# Patient Record
Sex: MALE | Race: ASIAN | NOT HISPANIC OR LATINO | Employment: FULL TIME | URBAN - METROPOLITAN AREA
[De-identification: names, ages, dates, MRNs, and addresses within clinical notes are randomized per-mention and may not be internally consistent; named-entity substitution may affect disease eponyms.]

---

## 2020-04-21 DIAGNOSIS — Z01.84 ANTIBODY RESPONSE EXAMINATION: ICD-10-CM

## 2020-04-22 ENCOUNTER — LAB VISIT (OUTPATIENT)
Dept: LAB | Facility: HOSPITAL | Age: 30
End: 2020-04-22
Attending: INTERNAL MEDICINE
Payer: COMMERCIAL

## 2020-04-22 DIAGNOSIS — Z01.84 ANTIBODY RESPONSE EXAMINATION: ICD-10-CM

## 2020-04-22 LAB — SARS-COV-2 IGG SERPL QL IA: NEGATIVE

## 2020-04-22 PROCEDURE — 86769 SARS-COV-2 COVID-19 ANTIBODY: CPT

## 2020-04-22 PROCEDURE — 36415 COLL VENOUS BLD VENIPUNCTURE: CPT

## 2020-06-12 ENCOUNTER — OFFICE VISIT (OUTPATIENT)
Dept: SURGERY | Facility: CLINIC | Age: 30
End: 2020-06-12
Payer: COMMERCIAL

## 2020-06-12 VITALS
DIASTOLIC BLOOD PRESSURE: 77 MMHG | HEART RATE: 50 BPM | WEIGHT: 156.75 LBS | HEIGHT: 70 IN | SYSTOLIC BLOOD PRESSURE: 119 MMHG | BODY MASS INDEX: 22.44 KG/M2

## 2020-06-12 DIAGNOSIS — K64.8 INTERNAL HEMORRHOIDS WITH COMPLICATION: Primary | ICD-10-CM

## 2020-06-12 PROCEDURE — 99999 PR PBB SHADOW E&M-EST. PATIENT-LVL III: CPT | Mod: PBBFAC,,, | Performed by: COLON & RECTAL SURGERY

## 2020-06-12 PROCEDURE — 3008F BODY MASS INDEX DOCD: CPT | Mod: CPTII,S$GLB,, | Performed by: COLON & RECTAL SURGERY

## 2020-06-12 PROCEDURE — 99203 PR OFFICE/OUTPT VISIT, NEW, LEVL III, 30-44 MIN: ICD-10-PCS | Mod: 25,S$GLB,, | Performed by: COLON & RECTAL SURGERY

## 2020-06-12 PROCEDURE — 99999 PR PBB SHADOW E&M-EST. PATIENT-LVL III: ICD-10-PCS | Mod: PBBFAC,,, | Performed by: COLON & RECTAL SURGERY

## 2020-06-12 PROCEDURE — 3008F PR BODY MASS INDEX (BMI) DOCUMENTED: ICD-10-PCS | Mod: CPTII,S$GLB,, | Performed by: COLON & RECTAL SURGERY

## 2020-06-12 PROCEDURE — 99203 OFFICE O/P NEW LOW 30 MIN: CPT | Mod: 25,S$GLB,, | Performed by: COLON & RECTAL SURGERY

## 2020-06-12 RX ORDER — MULTIVITAMIN
1 TABLET ORAL DAILY
COMMUNITY
End: 2023-03-25

## 2020-06-12 NOTE — PROGRESS NOTES
Subjective:       Patient ID: Joe Stephens is a 29 y.o. male.    Chief Complaint: Hemorrhoids    HPI new patient.  Greater than 10 year history of hemorrhoids manifest by bleeding and prolapse as well as some the post of toward pain.  Now has developed an area of chronic prolapse with discomfort.  Had banding greater than 5 years ago with no improvement.  Takes daily fiber in consistently.  He had a colonoscopy at the time of his banding.    Review of Systems   Constitutional: Negative for chills and fever.   Respiratory: Negative for cough and shortness of breath.    Cardiovascular: Negative for chest pain and palpitations.   Gastrointestinal: Negative for nausea and vomiting.   Genitourinary: Negative for dysuria and urgency.   Neurological: Negative for seizures and numbness.       Objective:      Physical Exam   Constitutional: He is oriented to person, place, and time. He appears well-developed and well-nourished.   Eyes: Conjunctivae and EOM are normal.   Pulmonary/Chest: Effort normal. No respiratory distress.   Genitourinary:   Genitourinary Comments: Anorectal Exam:     Perianal skin:  Right posterolateral internal external hemorrhoids with prolapse.  Reduce is incompletely    EREN: Normal resting and squeeze tone.  No masses except the palpable right posterolateral hemorrhoid.  Nontender    Anoscopy:  Normal distal rectal mucosa. Internal hemorrhoids with stigmata of bleeding or prolapse.      Musculoskeletal: Normal range of motion. He exhibits no edema.   Neurological: He is alert and oriented to person, place, and time.   Skin: Skin is warm and dry.       Assessment:       1. Internal hemorrhoids with complication        Plan:       Discuss treatment of hemorrhoids.  In this case because of the failure of the right posterolateral to reduce think he is best served by operative hemorrhoidectomy.  I have explained the risks, benefits, and alternatives of the procedure in detail.  The patient voices  understanding and all questions have been answered. The patient agrees to proceed as planned.    He will call to schedule.

## 2020-07-01 ENCOUNTER — TELEPHONE (OUTPATIENT)
Dept: SURGERY | Facility: CLINIC | Age: 30
End: 2020-07-01

## 2020-07-01 NOTE — TELEPHONE ENCOUNTER
----- Message from Nica Vazquez sent at 7/1/2020  2:01 PM CDT -----  Joe Stephens calling regarding Appointment Access  (message) want to schedule a hemorrhoid procedure.  He had his consult already. He is trying to schedule this with his time off from work. He is looking for 7/9 or 7/10 or 8/20 or 8/21. Please advise. [phone]

## 2020-07-01 NOTE — TELEPHONE ENCOUNTER
Spoke with patient. Requesting surgery. His available dates provided. Informed him I will speak with Dr. Baker tomorrow and call him back.

## 2020-07-07 ENCOUNTER — TELEPHONE (OUTPATIENT)
Dept: SURGERY | Facility: CLINIC | Age: 30
End: 2020-07-07

## 2020-07-07 NOTE — TELEPHONE ENCOUNTER
----- Message from Carlie Meek sent at 7/7/2020  9:44 AM CDT -----  Contact: self @ [phone]  Pt is calling to schedule his procedure.  He would like to be scheduled on 8-19-20, 8-17-20, 8-21-20 or 8-24-20 if possible.  Pls call.

## 2020-07-07 NOTE — TELEPHONE ENCOUNTER
Spoke with patient.  Informed him that the surgery is scheduled for 8/21 in the afternoon.  He can drink clear liquids until 2 hours before surgery and no solids after midnight.  He should use 2 enemas the morning of surgery.

## 2020-07-14 DIAGNOSIS — K64.8 INTERNAL HEMORRHOIDS WITH COMPLICATION: Primary | ICD-10-CM

## 2020-07-27 ENCOUNTER — TELEPHONE (OUTPATIENT)
Dept: PREADMISSION TESTING | Facility: HOSPITAL | Age: 30
End: 2020-07-27

## 2020-07-27 NOTE — TELEPHONE ENCOUNTER
----- Message from Dbebi Suggs RN sent at 7/27/2020 11:16 AM CDT -----  8/21 LABS    Please schedule for labs. Thank you :)

## 2020-07-27 NOTE — ANESTHESIA PAT ROS NOTE
07/27/2020  Joe Stephens is a 30 y.o., male.      Pre-op Assessment          Review of Systems  Anesthesia Hx:  History of prior surgery of interest to airway management or planning: Previous anesthesia: General Denies Family Hx of Anesthesia complications.   Denies Personal Hx of Anesthesia complications.   Social:  Non-Smoker, Social Alcohol Use    Hematology/Oncology:     Oncology Normal     EENT/Dental:EENT/Dental Normal   Cardiovascular:    Denies Angina.  Functional Capacity 6-7 METS    Pulmonary:   Denies Shortness of breath.  Denies Recent URI.    Renal/:  Renal/ Normal     Hepatic/GI:   HEMORRHOIDS   Musculoskeletal:  Musculoskeletal Normal    Neurological:  Neurology Normal    Endocrine:  Endocrine Normal    Psych:  Psychiatric Normal              Anesthesia Assessment: Preoperative EQUATION    Planned Procedure: Procedure(s) (LRB):  HEMORRHOIDECTOMY (N/A)  Requested Anesthesia Type:Monitor Anesthesia Care  Surgeon: Joon Baker MD  Service: Colon and Rectal  Known or anticipated Date of Surgery:8/21/2020    Surgeon notes: reviewed    Electronic QUestionnaire Assessment completed via nurse interview with patient.        Triage considerations:     The patient has no apparent active cardiac condition (No unstable coronary Syndrome such as severe unstable angina or recent [<1 month] myocardial infarction, decompensated CHF, severe valvular   disease or significant arrhythmia)    Previous anesthesia records:Not available    Last PCP note: outside [facility]   Subspecialty notes: CRS    Other important co-morbidities: N/A      Tests already available:  No recent tests.             Instructions given. (See in Nurse's note)    Optimization:  Anesthesia Preop Clinic Assessment  Indicated - NOT INDICATED    Medical Opinion Indicated - NOT INDICATED       Sub-specialist consult indicated:  NOT  INDICATED       Plan:    Testing:  BMP and Hematology Profile     Patient  has previously scheduled Medical Appointment: NOT AT THIS TIME    Navigation: Tests Scheduled.

## 2020-08-18 ENCOUNTER — LAB VISIT (OUTPATIENT)
Dept: URGENT CARE | Facility: CLINIC | Age: 30
End: 2020-08-18
Payer: COMMERCIAL

## 2020-08-18 ENCOUNTER — TELEPHONE (OUTPATIENT)
Dept: SURGERY | Facility: CLINIC | Age: 30
End: 2020-08-18

## 2020-08-18 VITALS — TEMPERATURE: 98 F

## 2020-08-18 DIAGNOSIS — Z01.818 PREOP TESTING: ICD-10-CM

## 2020-08-18 PROCEDURE — U0003 INFECTIOUS AGENT DETECTION BY NUCLEIC ACID (DNA OR RNA); SEVERE ACUTE RESPIRATORY SYNDROME CORONAVIRUS 2 (SARS-COV-2) (CORONAVIRUS DISEASE [COVID-19]), AMPLIFIED PROBE TECHNIQUE, MAKING USE OF HIGH THROUGHPUT TECHNOLOGIES AS DESCRIBED BY CMS-2020-01-R: HCPCS

## 2020-08-18 PROCEDURE — 99211 PR OFFICE/OUTPT VISIT, EST, LEVL I: ICD-10-PCS | Mod: S$GLB,,, | Performed by: PHYSICIAN ASSISTANT

## 2020-08-18 PROCEDURE — 99211 OFF/OP EST MAY X REQ PHY/QHP: CPT | Mod: S$GLB,,, | Performed by: PHYSICIAN ASSISTANT

## 2020-08-18 NOTE — TELEPHONE ENCOUNTER
Spoke with patient regarding his covid test needed for surgery on Friday 8/21.  He is not able to make the appointment at [facility].  Informed him that he can go to the urgent care at [address] [facility] between 9a - 9p.

## 2020-08-18 NOTE — TELEPHONE ENCOUNTER
----- Message from Nica Vazquez sent at 8/18/2020 12:15 PM CDT -----  Pt call stated he is not sure if he could do it today, because he have full clinic in [place]. He stated he will check to see if [place] [facility] can swab him. [phone].     Margaret Dong please call him if that is a problem

## 2020-08-19 LAB — SARS-COV-2 RNA RESP QL NAA+PROBE: NOT DETECTED

## 2020-08-20 ENCOUNTER — TELEPHONE (OUTPATIENT)
Dept: SURGERY | Facility: CLINIC | Age: 30
End: 2020-08-20

## 2020-08-20 ENCOUNTER — ANESTHESIA EVENT (OUTPATIENT)
Dept: SURGERY | Facility: HOSPITAL | Age: 30
End: 2020-08-20
Payer: COMMERCIAL

## 2020-08-20 NOTE — TELEPHONE ENCOUNTER
----- Message from Brittany Flood sent at 8/20/2020  4:50 PM CDT -----  Contact: pt  Pt returned call to clinic to confirm message was received re surgery

## 2020-08-20 NOTE — TELEPHONE ENCOUNTER
Spoke with patient and informed him that Dr. Baker moved his surgery time up.  He would like for him to be here at 8:30 am.

## 2020-08-20 NOTE — ANESTHESIA PREPROCEDURE EVALUATION
[facility]  Anesthesia Pre-Operative Evaluation         Patient Name: Joe Stephens  YOB: 1990  MRN: [id number]    SUBJECTIVE:     Pre-operative evaluation for Procedure(s) (LRB):  HEMORRHOIDECTOMY (N/A)     08/21/2020    Joe Stephens is a 30 y.o. male w/ a significant PMHx of hemorrhoids who now has developed chronic prolapse with discomfort. He previously had banding without improvement 5 years ago at an OSH.    Patient now presents for the above procedure(s).      LDA: None documented.       Prev airway: None documented.    Drips: None documented.      Patient Active Problem List   Diagnosis    Internal hemorrhoids with complication       Review of patient's allergies indicates:   Allergen Reactions    Sulfa (sulfonamide antibiotics)        Current Inpatient Medications:      No current facility-administered medications on file prior to encounter.      Current Outpatient Medications on File Prior to Encounter   Medication Sig Dispense Refill    multivitamin (ONE DAILY MULTIVITAMIN) per tablet Take 1 tablet by mouth once daily.      phenyleph/pramoxin/glycr/w.pet (HEMORRHOIDAL CREAM RECT) Place rectally.         Past Surgical History:   Procedure Laterality Date    COLONOSCOPY  2010       Social History     Socioeconomic History    Marital status: Single     Spouse name: Not on file    Number of children: Not on file    Years of education: Not on file    Highest education level: Not on file   Occupational History    Not on file   Social Needs    Financial resource strain: Not on file    Food insecurity     Worry: Not on file     Inability: Not on file    Transportation needs     Medical: Not on file     Non-medical: Not on file   Tobacco Use    Smoking status: Never Smoker   Substance and Sexual Activity    Alcohol use: Not on file    Drug use: Not on file    Sexual activity: Not on file   Lifestyle    Physical activity     Days per week: Not on file      Minutes per session: Not on file    Stress: Not on file   Relationships    Social connections     Talks on phone: Not on file     Gets together: Not on file     Attends Shinto service: Not on file     Active member of club or organization: Not on file     Attends meetings of clubs or organizations: Not on file     Relationship status: Not on file   Other Topics Concern    Not on file   Social History Narrative    Not on file       OBJECTIVE:     Vital Signs Range (Last 24H):  Temp:  [37 °C (98.6 °F)]   Pulse:  [59]   Resp:  [18]   BP: (135)/(76)   SpO2:  [100 %]       Significant Labs:  No results found for: WBC, HGB, HCT, PLT, CHOL, TRIG, HDL, LDLDIRECT, ALT, AST, NA, K, CL, CREATININE, BUN, CO2, TSH, PSA, INR, GLUF, HGBA1C, MICROALBUR    Diagnostic Studies: No relevant studies.    EKG:   No results found for this or any previous visit.    2D ECHO:  TTE:  No results found for this or any previous visit.    RAJ:  No results found for this or any previous visit.    ASSESSMENT/PLAN:         Anesthesia Evaluation    I have reviewed the Patient Summary Reports.     I have reviewed the Nursing Notes. I have reviewed the NPO Status.   I have reviewed the Medications.     Review of Systems  Anesthesia Hx:  No problems with previous Anesthesia  History of prior surgery of interest to airway management or planning: Previous anesthesia: General Denies Family Hx of Anesthesia complications.   Denies Personal Hx of Anesthesia complications.   Social:  Non-Smoker, Social Alcohol Use    Hematology/Oncology:     Oncology Normal     EENT/Dental:EENT/Dental Normal   Cardiovascular:    Denies Angina.  Functional Capacity good / => 4 METS    Pulmonary:   Denies Shortness of breath.  Denies Recent URI.    Renal/:  Renal/ Normal     Hepatic/GI:   HEMORRHOIDS   Musculoskeletal:  Musculoskeletal Normal    Neurological:  Neurology Normal    Endocrine:  Endocrine Normal    Psych:  Psychiatric Normal           Physical  Exam  General:  Well nourished    Airway/Jaw/Neck:  Airway Findings: Mouth Opening: Normal Tongue: Normal  General Airway Assessment: Adult  Mallampati: I  TM Distance: Normal, at least 6 cm  Jaw/Neck Findings:  Neck ROM: Normal ROM     Eyes/Ears/Nose:  EYES/EARS/NOSE FINDINGS: Normal   Dental:  Dental Findings: In tact   Chest/Lungs:  Chest/Lungs Findings: Clear to auscultation, Normal Respiratory Rate     Heart/Vascular:  Heart Findings: Rate: Normal  Rhythm: Regular Rhythm  Sounds: Normal  Vascular Findings: Normal    Abdomen:  Abdomen Findings:  Normal, Soft, Nontender      Skin:  Skin Findings: Normal    Mental Status:  Mental Status Findings:  Cooperative, Alert and Oriented         Anesthesia Plan  Type of Anesthesia, risks & benefits discussed:  Anesthesia Type:  MAC, general  Patient's Preference:   Intra-op Monitoring Plan: standard ASA monitors  Intra-op Monitoring Plan Comments:   Post Op Pain Control Plan: multimodal analgesia  Post Op Pain Control Plan Comments:   Induction:   IV  Beta Blocker:  Patient is not currently on a Beta-Blocker (No further documentation required).       Informed Consent: Patient understands risks and agrees with Anesthesia plan.  Questions answered. Anesthesia consent signed with patient.  ASA Score: 1     Day of Surgery Review of History & Physical:    H&P update referred to the surgeon.         Ready For Surgery From Anesthesia Perspective.

## 2020-08-20 NOTE — TELEPHONE ENCOUNTER
Spoke with patient and informed him that Dr. Baker moved his time up and would like him here for 8:30am.

## 2020-08-21 ENCOUNTER — ANESTHESIA (OUTPATIENT)
Dept: SURGERY | Facility: HOSPITAL | Age: 30
End: 2020-08-21
Payer: COMMERCIAL

## 2020-08-21 ENCOUNTER — HOSPITAL ENCOUNTER (OUTPATIENT)
Facility: HOSPITAL | Age: 30
Discharge: HOME | End: 2020-08-21
Attending: COLON & RECTAL SURGERY | Admitting: COLON & RECTAL SURGERY
Payer: COMMERCIAL

## 2020-08-21 VITALS
SYSTOLIC BLOOD PRESSURE: 126 MMHG | WEIGHT: 156 LBS | HEART RATE: 67 BPM | TEMPERATURE: 99 F | HEIGHT: 70 IN | DIASTOLIC BLOOD PRESSURE: 62 MMHG | BODY MASS INDEX: 22.33 KG/M2 | OXYGEN SATURATION: 100 % | RESPIRATION RATE: 18 BRPM

## 2020-08-21 DIAGNOSIS — K64.8 INTERNAL HEMORRHOIDS WITH COMPLICATION: Primary | ICD-10-CM

## 2020-08-21 DIAGNOSIS — Z01.818 PRE-OP TESTING: ICD-10-CM

## 2020-08-21 LAB
ANION GAP SERPL CALC-SCNC: 5 MMOL/L (ref 8–16)
BUN SERPL-MCNC: 17 MG/DL (ref 6–20)
CALCIUM SERPL-MCNC: 9.6 MG/DL (ref 8.7–10.5)
CHLORIDE SERPL-SCNC: 108 MMOL/L (ref 95–110)
CO2 SERPL-SCNC: 25 MMOL/L (ref 23–29)
CREAT SERPL-MCNC: 1.4 MG/DL (ref 0.5–1.4)
ERYTHROCYTE [DISTWIDTH] IN BLOOD BY AUTOMATED COUNT: 14.5 % (ref 11.5–14.5)
EST. GFR  (AFRICAN AMERICAN): >60 ML/MIN/1.73 M^2
EST. GFR  (NON AFRICAN AMERICAN): >60 ML/MIN/1.73 M^2
GLUCOSE SERPL-MCNC: 96 MG/DL (ref 70–110)
HCT VFR BLD AUTO: 38.6 % (ref 40–54)
HGB BLD-MCNC: 11.8 G/DL (ref 14–18)
MCH RBC QN AUTO: 25.4 PG (ref 27–31)
MCHC RBC AUTO-ENTMCNC: 30.6 G/DL (ref 32–36)
MCV RBC AUTO: 83 FL (ref 82–98)
PLATELET # BLD AUTO: 214 K/UL (ref 150–350)
PMV BLD AUTO: 10.8 FL (ref 9.2–12.9)
POTASSIUM SERPL-SCNC: 4.2 MMOL/L (ref 3.5–5.1)
RBC # BLD AUTO: 4.65 M/UL (ref 4.6–6.2)
SODIUM SERPL-SCNC: 138 MMOL/L (ref 136–145)
WBC # BLD AUTO: 5.49 K/UL (ref 3.9–12.7)

## 2020-08-21 PROCEDURE — D9220A PRA ANESTHESIA: Mod: ,,, | Performed by: ANESTHESIOLOGY

## 2020-08-21 PROCEDURE — 46255 PR HEMORRHOIDECTOMY,INT/EXT,1 COLUMN/GROUP: ICD-10-PCS | Mod: ,,, | Performed by: COLON & RECTAL SURGERY

## 2020-08-21 PROCEDURE — C9290 INJ, BUPIVACAINE LIPOSOME: HCPCS | Performed by: COLON & RECTAL SURGERY

## 2020-08-21 PROCEDURE — 25000003 PHARM REV CODE 250: Performed by: NURSE PRACTITIONER

## 2020-08-21 PROCEDURE — 80048 BASIC METABOLIC PNL TOTAL CA: CPT

## 2020-08-21 PROCEDURE — 36000706: Performed by: COLON & RECTAL SURGERY

## 2020-08-21 PROCEDURE — 88304 TISSUE EXAM BY PATHOLOGIST: CPT | Performed by: PATHOLOGY

## 2020-08-21 PROCEDURE — 37000008 HC ANESTHESIA 1ST 15 MINUTES: Performed by: COLON & RECTAL SURGERY

## 2020-08-21 PROCEDURE — 63600175 PHARM REV CODE 636 W HCPCS: Performed by: STUDENT IN AN ORGANIZED HEALTH CARE EDUCATION/TRAINING PROGRAM

## 2020-08-21 PROCEDURE — 71000044 HC DOSC ROUTINE RECOVERY FIRST HOUR: Performed by: COLON & RECTAL SURGERY

## 2020-08-21 PROCEDURE — 85027 COMPLETE CBC AUTOMATED: CPT

## 2020-08-21 PROCEDURE — 46255 REMOVE INT/EXT HEM 1 GROUP: CPT | Mod: ,,, | Performed by: COLON & RECTAL SURGERY

## 2020-08-21 PROCEDURE — 36000707: Performed by: COLON & RECTAL SURGERY

## 2020-08-21 PROCEDURE — 71000016 HC POSTOP RECOV ADDL HR: Performed by: COLON & RECTAL SURGERY

## 2020-08-21 PROCEDURE — D9220A PRA ANESTHESIA: ICD-10-PCS | Mod: ,,, | Performed by: ANESTHESIOLOGY

## 2020-08-21 PROCEDURE — 71000015 HC POSTOP RECOV 1ST HR: Performed by: COLON & RECTAL SURGERY

## 2020-08-21 PROCEDURE — 46945 PR HEMORRHOIDECTOMY, INT, LIGATION, W/O IMG, SNGL: ICD-10-PCS | Mod: 51,,, | Performed by: COLON & RECTAL SURGERY

## 2020-08-21 PROCEDURE — 46945 INT HRHC LIG 1 HROID W/O IMG: CPT | Mod: 51,,, | Performed by: COLON & RECTAL SURGERY

## 2020-08-21 PROCEDURE — 25000003 PHARM REV CODE 250: Performed by: STUDENT IN AN ORGANIZED HEALTH CARE EDUCATION/TRAINING PROGRAM

## 2020-08-21 PROCEDURE — 88304 PR  SURG PATH,LEVEL III: ICD-10-PCS | Mod: 26,,, | Performed by: PATHOLOGY

## 2020-08-21 PROCEDURE — 27201423 OPTIME MED/SURG SUP & DEVICES STERILE SUPPLY: Performed by: COLON & RECTAL SURGERY

## 2020-08-21 PROCEDURE — 63600175 PHARM REV CODE 636 W HCPCS: Performed by: COLON & RECTAL SURGERY

## 2020-08-21 PROCEDURE — 88304 TISSUE EXAM BY PATHOLOGIST: CPT | Mod: 26,,, | Performed by: PATHOLOGY

## 2020-08-21 PROCEDURE — 37000009 HC ANESTHESIA EA ADD 15 MINS: Performed by: COLON & RECTAL SURGERY

## 2020-08-21 PROCEDURE — 25000003 PHARM REV CODE 250: Performed by: COLON & RECTAL SURGERY

## 2020-08-21 RX ORDER — MUPIROCIN 20 MG/G
OINTMENT TOPICAL
Status: DISPENSED | OUTPATIENT
Start: 2020-08-21

## 2020-08-21 RX ORDER — MIDAZOLAM HYDROCHLORIDE 1 MG/ML
INJECTION, SOLUTION INTRAMUSCULAR; INTRAVENOUS
Status: DISCONTINUED | OUTPATIENT
Start: 2020-08-21 | End: 2020-08-21

## 2020-08-21 RX ORDER — FENTANYL CITRATE 50 UG/ML
25 INJECTION, SOLUTION INTRAMUSCULAR; INTRAVENOUS EVERY 5 MIN PRN
Status: DISCONTINUED | OUTPATIENT
Start: 2020-08-21 | End: 2020-08-21 | Stop reason: HOSPADM

## 2020-08-21 RX ORDER — OXYCODONE AND ACETAMINOPHEN 5; 325 MG/1; MG/1
1 TABLET ORAL ONCE
Status: COMPLETED | OUTPATIENT
Start: 2020-08-21 | End: 2020-08-21

## 2020-08-21 RX ORDER — SODIUM CHLORIDE 9 MG/ML
INJECTION, SOLUTION INTRAVENOUS CONTINUOUS PRN
Status: DISCONTINUED | OUTPATIENT
Start: 2020-08-21 | End: 2020-08-21

## 2020-08-21 RX ORDER — ROCURONIUM BROMIDE 10 MG/ML
INJECTION, SOLUTION INTRAVENOUS
Status: DISCONTINUED | OUTPATIENT
Start: 2020-08-21 | End: 2020-08-21

## 2020-08-21 RX ORDER — IBUPROFEN 800 MG/1
800 TABLET ORAL 3 TIMES DAILY
Qty: 30 TABLET | Refills: 0 | Status: SHIPPED | OUTPATIENT
Start: 2020-08-21 | End: 2020-08-31

## 2020-08-21 RX ORDER — PHENYLEPHRINE HCL IN 0.9% NACL 1 MG/10 ML
SYRINGE (ML) INTRAVENOUS
Status: DISCONTINUED | OUTPATIENT
Start: 2020-08-21 | End: 2020-08-21

## 2020-08-21 RX ORDER — KETAMINE HCL IN 0.9 % NACL 50 MG/5 ML
SYRINGE (ML) INTRAVENOUS
Status: DISCONTINUED | OUTPATIENT
Start: 2020-08-21 | End: 2020-08-21

## 2020-08-21 RX ORDER — SODIUM CHLORIDE 0.9 % (FLUSH) 0.9 %
10 SYRINGE (ML) INJECTION
Status: DISCONTINUED | OUTPATIENT
Start: 2020-08-21 | End: 2020-08-21 | Stop reason: HOSPADM

## 2020-08-21 RX ORDER — DEXAMETHASONE SODIUM PHOSPHATE 4 MG/ML
INJECTION, SOLUTION INTRA-ARTICULAR; INTRALESIONAL; INTRAMUSCULAR; INTRAVENOUS; SOFT TISSUE
Status: DISCONTINUED | OUTPATIENT
Start: 2020-08-21 | End: 2020-08-21

## 2020-08-21 RX ORDER — SODIUM CHLORIDE 9 MG/ML
INJECTION, SOLUTION INTRAVENOUS CONTINUOUS
Status: ACTIVE | OUTPATIENT
Start: 2020-08-21

## 2020-08-21 RX ORDER — FENTANYL CITRATE 50 UG/ML
INJECTION, SOLUTION INTRAMUSCULAR; INTRAVENOUS
Status: DISCONTINUED | OUTPATIENT
Start: 2020-08-21 | End: 2020-08-21

## 2020-08-21 RX ORDER — BUPIVACAINE HYDROCHLORIDE 2.5 MG/ML
INJECTION, SOLUTION EPIDURAL; INFILTRATION; INTRACAUDAL
Status: DISCONTINUED | OUTPATIENT
Start: 2020-08-21 | End: 2020-08-21 | Stop reason: HOSPADM

## 2020-08-21 RX ORDER — PROPOFOL 10 MG/ML
INJECTION, EMULSION INTRAVENOUS
Status: DISCONTINUED | OUTPATIENT
Start: 2020-08-21 | End: 2020-08-21

## 2020-08-21 RX ORDER — LIDOCAINE HCL/PF 100 MG/5ML
SYRINGE (ML) INTRAVENOUS
Status: DISCONTINUED | OUTPATIENT
Start: 2020-08-21 | End: 2020-08-21

## 2020-08-21 RX ORDER — OXYCODONE AND ACETAMINOPHEN 5; 325 MG/1; MG/1
1 TABLET ORAL EVERY 4 HOURS PRN
Qty: 25 TABLET | Refills: 0 | Status: ON HOLD | OUTPATIENT
Start: 2020-08-21 | End: 2020-08-24 | Stop reason: HOSPADM

## 2020-08-21 RX ORDER — POLYETHYLENE GLYCOL 3350 17 G/17G
17 POWDER, FOR SOLUTION ORAL DAILY
Qty: 20 PACKET | Refills: 0 | Status: SHIPPED | OUTPATIENT
Start: 2020-08-21 | End: 2020-09-10

## 2020-08-21 RX ADMIN — FENTANYL CITRATE 25 MCG: 50 INJECTION INTRAMUSCULAR; INTRAVENOUS at 01:08

## 2020-08-21 RX ADMIN — FENTANYL CITRATE 100 MCG: 50 INJECTION, SOLUTION INTRAMUSCULAR; INTRAVENOUS at 10:08

## 2020-08-21 RX ADMIN — SODIUM CHLORIDE: 0.9 INJECTION, SOLUTION INTRAVENOUS at 10:08

## 2020-08-21 RX ADMIN — DEXAMETHASONE SODIUM PHOSPHATE 4 MG: 4 INJECTION, SOLUTION INTRAMUSCULAR; INTRAVENOUS at 10:08

## 2020-08-21 RX ADMIN — MUPIROCIN: 20 OINTMENT TOPICAL at 09:08

## 2020-08-21 RX ADMIN — SODIUM CHLORIDE: 0.9 INJECTION, SOLUTION INTRAVENOUS at 09:08

## 2020-08-21 RX ADMIN — Medication 100 MCG: at 11:08

## 2020-08-21 RX ADMIN — OXYCODONE HYDROCHLORIDE AND ACETAMINOPHEN 1 TABLET: 5; 325 TABLET ORAL at 12:08

## 2020-08-21 RX ADMIN — PROPOFOL 150 MG: 10 INJECTION, EMULSION INTRAVENOUS at 10:08

## 2020-08-21 RX ADMIN — Medication 20 MG: at 11:08

## 2020-08-21 RX ADMIN — LIDOCAINE HYDROCHLORIDE 60 MG: 20 INJECTION, SOLUTION INTRAVENOUS at 10:08

## 2020-08-21 RX ADMIN — Medication 200 MCG: at 11:08

## 2020-08-21 RX ADMIN — ROCURONIUM BROMIDE 50 MG: 10 INJECTION, SOLUTION INTRAVENOUS at 10:08

## 2020-08-21 RX ADMIN — MIDAZOLAM HYDROCHLORIDE 2 MG: 1 INJECTION, SOLUTION INTRAMUSCULAR; INTRAVENOUS at 10:08

## 2020-08-21 NOTE — H&P
[facility]  Colorectal Surgery  History & Physical    Patient Name: Joe Stephens  MRN: [id number]  Admission Date: 8/21/2020  Attending Physician: Joon Baker MD   Primary Care Provider: Primary Doctor No    Subjective:     Chief Complaint/Reason for Admission: Prolapsed and bleeding internal hemorrhoids    History of Present Illness: 10 year history of hemorrhoids manifest by bleeding and prolapse as well as some the post of toward pain.  Now has developed an area of chronic prolapse with discomfort.  Had banding greater than 5 years ago with no improvement.  Takes daily fiber in consistently.  He had a colonoscopy at the time of his banding.    PTA Medications   Medication Sig    multivitamin (ONE DAILY MULTIVITAMIN) per tablet Take 1 tablet by mouth once daily.    phenyleph/pramoxin/glycr/w.pet (HEMORRHOIDAL CREAM RECT) Place rectally.       Review of patient's allergies indicates:   Allergen Reactions    Sulfa (sulfonamide antibiotics)        History reviewed. No pertinent past medical history.  Past Surgical History:   Procedure Laterality Date    COLONOSCOPY  2010     Family History     None        Tobacco Use    Smoking status: Never Smoker   Substance and Sexual Activity    Alcohol use: Not on file    Drug use: Not on file    Sexual activity: Not on file     Review of Systems   All other systems reviewed and are negative.    Objective:     Vital Signs (Most Recent):  Temp: 98.6 °F (37 °C) (08/21/20 0934)  Pulse: (!) 59 (08/21/20 0934)  Resp: 18 (08/21/20 0934)  BP: 135/76 (08/21/20 0936)  SpO2: 100 % (08/21/20 0934) Vital Signs (24h Range):  Temp:  [98.6 °F (37 °C)] 98.6 °F (37 °C)  Pulse:  [59] 59  Resp:  [18] 18  SpO2:  [100 %] 100 %  BP: (135)/(76) 135/76     Weight: 70.8 kg (156 lb)  Body mass index is 22.38 kg/m².    Physical Exam  Constitutional:       Appearance: Normal appearance.   HENT:      Head: Normocephalic and atraumatic.   Eyes:      General: No scleral  icterus.  Cardiovascular:      Rate and Rhythm: Normal rate and regular rhythm.   Pulmonary:      Effort: Pulmonary effort is normal.   Abdominal:      General: Abdomen is flat. There is no distension.      Palpations: There is no mass.      Tenderness: There is no abdominal tenderness.      Hernia: No hernia is present.   Musculoskeletal:      Right lower leg: No edema.      Left lower leg: No edema.   Skin:     General: Skin is warm.      Capillary Refill: Capillary refill takes less than 2 seconds.   Neurological:      Mental Status: He is alert.   Psychiatric:         Mood and Affect: Mood normal.         Behavior: Behavior normal.           Significant Labs:  All pertinent lab results within the last 24 hours have been reviewed.     Significant Diagnostics:  None    Assessment/Plan:     Active Diagnoses:    Diagnosis Date Noted POA    Internal hemorrhoids with complication [K64.8] 08/21/2020 Yes      Problems Resolved During this Admission:     Ready for OR; planned for hemorrhoidectomy. Consent signed in clinic      Alvaro Garcia MD  Colorectal Surgery  [facility]

## 2020-08-21 NOTE — PLAN OF CARE
Preop questions answered. Instructed pt to call with any questions. Call light within reach. Family at bedside. Projected surgery start time given to pt. Pt. Verbalized understanding.Pt denies any metal implants, body piercings or jewelry, dentures, contacts or hearing aids.

## 2020-08-21 NOTE — ANESTHESIA POSTPROCEDURE EVALUATION
Anesthesia Post Evaluation    Patient: Joe Stephens    Procedure(s) Performed: Procedure(s) (LRB):  HEMORRHOIDECTOMY: internal and external x1 column (N/A)  LIGATION, HEMORRHOIDS (N/A)    Final Anesthesia Type: general    Patient location during evaluation: PACU  Patient participation: Yes- Able to Participate  Level of consciousness: awake and alert and oriented  Post-procedure vital signs: reviewed and stable  Pain management: adequate  Airway patency: patent    PONV status at discharge: No PONV  Anesthetic complications: no      Cardiovascular status: blood pressure returned to baseline  Respiratory status: unassisted, spontaneous ventilation and room air  Hydration status: euvolemic  Follow-up not needed.          Vitals Value Taken Time   /87 08/21/20 1232   Temp 37.3 °C (99.1 °F) 08/21/20 1157   Pulse 81 08/21/20 1236   Resp 17 08/21/20 1230   SpO2 100 % 08/21/20 1236   Vitals shown include unvalidated device data.      No case tracking events are documented in the log.      Pain/Konrad Score: Konrad Score: 9 (8/21/2020 12:00 PM)

## 2020-08-21 NOTE — BRIEF OP NOTE
[facility]  Brief Operative Note    Surgery Date: 8/21/2020     Surgeon(s) and Role:     * Joon Baker MD - Primary     * Alvaro Garcia MD - Fellow    Assisting Surgeon: None    Pre-op Diagnosis:  Internal hemorrhoids with complication [K64.8]    Post-op Diagnosis:  Post-Op Diagnosis Codes:     * Internal hemorrhoids with complication [K64.8]    Procedure(s) (LRB):  HEMORRHOIDECTOMY: internal and external x1 column (N/A)  LIGATION, HEMORRHOIDS (N/A)    Anesthesia: General    Description of the findings of the procedure(s): prolapsed right posterior internal hemorrhoid, bulging left lateral hemorrhoid. Closed right posterior hemorrhoidectomy, ligation of left lateral hemorrhoid plexus.    Estimated Blood Loss: * No values recorded between 8/21/2020 10:59 AM and 8/21/2020 11:38 AM *         Specimens:   Specimen (12h ago, onward)    None            Discharge Note    OUTCOME: Patient tolerated treatment/procedure well without complication and is now ready for discharge.    DISPOSITION: Home or Self Care    FINAL DIAGNOSIS:  Grade 4 internal hemorrhoid    FOLLOWUP: In clinic    DISCHARGE INSTRUCTIONS:  No discharge procedures on file.

## 2020-08-21 NOTE — ANESTHESIA PROCEDURE NOTES
Intubation  Performed by: Cesar Rodriguez MD  Authorized by: Buster Kaplan MD     Intubation:     Induction:  Intravenous    Intubated:  Postinduction    Mask Ventilation:  Easy mask    Attempts:  1    Attempted By:  Resident anesthesiologist    Blade:  Albarado 2    Laryngeal View Grade: Grade I - full view of chords      Difficult Airway Encountered?: No      Complications:  None    Airway Device:  Oral endotracheal tube    Airway Device Size:  7.5    Style/Cuff Inflation:  Cuffed (inflated to minimal occlusive pressure)    Tube secured:  24    Secured at:  The teeth    Placement Verified By:  Capnometry    Complicating Factors:  None    Findings Post-Intubation:  BS equal bilateral

## 2020-08-21 NOTE — DISCHARGE INSTRUCTIONS
You have packing inside your anus. You do not need to remove this packing; it will fall out on its own or with a bowel movement. Once the packing falls out, you should continue to place 4x4 gauze pads over the incisions as needed.  You may shower.  Please perform warm water soaks 3 times daily and after every bowel movement for 10 minutes.  After bowel movements, please soak your wounds in warm water for 10 minutes  You will notice some bleeding from your incisions over the next few days, which is expected, however you should be able to see the bottom of the toilet in the toilet bowl. If the bleeding becomes excessive, please call the office.  Please continue to keep your stools soft so you do not have to strain, but you should not have any diarrhea, as this will irritate the area around your incisions.

## 2020-08-23 NOTE — OP NOTE
JOE STEPHENS  [id number]  [id number]    OPERATIVE NOTE:    DATE OF OPERATION: 08/20/2020    PREOPERATIVE DIAGNOSIS: Internal and external hemorrhoids with bleeding and prolapse    POSTOPERATIVE DIAGNOSIS: Internal hemorrhoids with bleeding and prolapse.     PROCEDURE PERFORMED: Hemorrhoidectomy, internal and external x1 column.  Hemorrhoid ligation x 1 column    ATTENDING SURGEON: Joon Baker MD    RESIDENT/FELLOW SURGEON: Alvaro Garcia MD    ANESTHESIA: GETA    ESTIMATED BLOOD LOSS: 50 mL    FINDINGS:  1.  Chronically prolapsed internal-external hemorrhoid right posterolateral.  Internal hemorrhoids without stigmata prolapse left lateral and right anterolateral.    SPECIMENS:   1. Left posterolateral hemorrhoid    COMPLICATIONS:  None    DISPOSITION: PACU    CONDITION: good    INDICATION FOR PROCEDURE:  Joe Stephens is a 30 y.o. male with a chronically prolapsed right posterolateral hemorrhoid.    DESCRIPTION OF PROCEDURE:   After obtaining informed consent the patient was taken the operating room and placed in the supine position.  He underwent general endotracheal anesthesia and then was placed prone casie-knife..  He was then prepped draped sterile manner.  A preoperative time-out was performed.    Inspection and anoscopy revealed a large right posterolateral combined internal-external hemorrhoid with fissuring in the middle of this.  The remaining internal hemorrhoids while friable did not have evidence of prolapse.  A standard elliptical excisional hemorrhoidectomy was performed.  A 3 0 chromic was placed at the apex of the hemorrhoid and secured with a figure-of-eight.  Using electrocautery the hemorrhoid was excised in elliptical fashion taking care to dissect the hemorrhoid tissue off of the underlying internal sphincter.  The tissue was friable and there was more than the normal amount of bleeding associated with this.  Once the hemorrhoid was completely excised mucocutaneous closure  was performed using a running 3 0 chromic.  Hemostasis required additional transfixing stitches.  Once hemostasis had been achieved we then turned our attention to the other hemorrhoids and noted that the left lateral hemorrhoid was suitable for ligation.  3- 0 chromic was used to perform a suture ligature and mucopexy. Gelfoam with surgicel was placed in the anal canal.     The pt tolerated the procedure and was taken to the recovery room in stable condition.            Joon Baker MD  , Colon & Rectal Surgery

## 2020-08-24 ENCOUNTER — OFFICE VISIT (OUTPATIENT)
Dept: SURGERY | Facility: CLINIC | Age: 30
End: 2020-08-24
Payer: COMMERCIAL

## 2020-08-24 ENCOUNTER — HOSPITAL ENCOUNTER (OUTPATIENT)
Facility: HOSPITAL | Age: 30
Discharge: HOME | End: 2020-08-24
Attending: COLON & RECTAL SURGERY | Admitting: COLON & RECTAL SURGERY
Payer: COMMERCIAL

## 2020-08-24 ENCOUNTER — ANESTHESIA EVENT (OUTPATIENT)
Dept: SURGERY | Facility: HOSPITAL | Age: 30
End: 2020-08-24
Payer: COMMERCIAL

## 2020-08-24 ENCOUNTER — ANESTHESIA (OUTPATIENT)
Dept: SURGERY | Facility: HOSPITAL | Age: 30
End: 2020-08-24
Payer: COMMERCIAL

## 2020-08-24 VITALS
OXYGEN SATURATION: 100 % | SYSTOLIC BLOOD PRESSURE: 142 MMHG | RESPIRATION RATE: 18 BRPM | HEART RATE: 62 BPM | BODY MASS INDEX: 21.43 KG/M2 | DIASTOLIC BLOOD PRESSURE: 84 MMHG | TEMPERATURE: 98 F | WEIGHT: 158 LBS

## 2020-08-24 VITALS
SYSTOLIC BLOOD PRESSURE: 144 MMHG | DIASTOLIC BLOOD PRESSURE: 81 MMHG | WEIGHT: 158.5 LBS | HEART RATE: 73 BPM | BODY MASS INDEX: 21.47 KG/M2 | HEIGHT: 72 IN

## 2020-08-24 DIAGNOSIS — K64.8 INTERNAL AND EXTERNAL THROMBOSED HEMORRHOIDS: Primary | ICD-10-CM

## 2020-08-24 DIAGNOSIS — K64.5 INTERNAL AND EXTERNAL THROMBOSED HEMORRHOIDS: Primary | ICD-10-CM

## 2020-08-24 DIAGNOSIS — K64.8 INTERNAL AND EXTERNAL THROMBOSED HEMORRHOIDS: ICD-10-CM

## 2020-08-24 DIAGNOSIS — K64.5 INTERNAL AND EXTERNAL THROMBOSED HEMORRHOIDS: ICD-10-CM

## 2020-08-24 LAB — SARS-COV-2 RDRP RESP QL NAA+PROBE: NEGATIVE

## 2020-08-24 PROCEDURE — D9220A PRA ANESTHESIA: Mod: ANES,,, | Performed by: ANESTHESIOLOGY

## 2020-08-24 PROCEDURE — 46255 REMOVE INT/EXT HEM 1 GROUP: CPT | Mod: ,,, | Performed by: COLON & RECTAL SURGERY

## 2020-08-24 PROCEDURE — 25000003 PHARM REV CODE 250: Performed by: NURSE ANESTHETIST, CERTIFIED REGISTERED

## 2020-08-24 PROCEDURE — 71000044 HC DOSC ROUTINE RECOVERY FIRST HOUR: Performed by: COLON & RECTAL SURGERY

## 2020-08-24 PROCEDURE — D9220A PRA ANESTHESIA: Mod: CRNA,,, | Performed by: NURSE ANESTHETIST, CERTIFIED REGISTERED

## 2020-08-24 PROCEDURE — 99024 PR POST-OP FOLLOW-UP VISIT: ICD-10-PCS | Mod: S$GLB,,, | Performed by: COLON & RECTAL SURGERY

## 2020-08-24 PROCEDURE — U0002 COVID-19 LAB TEST NON-CDC: HCPCS

## 2020-08-24 PROCEDURE — 36000706: Performed by: COLON & RECTAL SURGERY

## 2020-08-24 PROCEDURE — 36000707: Performed by: COLON & RECTAL SURGERY

## 2020-08-24 PROCEDURE — 63600175 PHARM REV CODE 636 W HCPCS: Performed by: COLON & RECTAL SURGERY

## 2020-08-24 PROCEDURE — 25000003 PHARM REV CODE 250: Performed by: COLON & RECTAL SURGERY

## 2020-08-24 PROCEDURE — 88304 TISSUE EXAM BY PATHOLOGIST: CPT | Performed by: PATHOLOGY

## 2020-08-24 PROCEDURE — 63600175 PHARM REV CODE 636 W HCPCS: Performed by: NURSE ANESTHETIST, CERTIFIED REGISTERED

## 2020-08-24 PROCEDURE — 71000015 HC POSTOP RECOV 1ST HR: Performed by: COLON & RECTAL SURGERY

## 2020-08-24 PROCEDURE — 37000009 HC ANESTHESIA EA ADD 15 MINS: Performed by: COLON & RECTAL SURGERY

## 2020-08-24 PROCEDURE — D9220A PRA ANESTHESIA: ICD-10-PCS | Mod: ANES,,, | Performed by: ANESTHESIOLOGY

## 2020-08-24 PROCEDURE — 37000008 HC ANESTHESIA 1ST 15 MINUTES: Performed by: COLON & RECTAL SURGERY

## 2020-08-24 PROCEDURE — 71000016 HC POSTOP RECOV ADDL HR: Performed by: COLON & RECTAL SURGERY

## 2020-08-24 PROCEDURE — D9220A PRA ANESTHESIA: ICD-10-PCS | Mod: CRNA,,, | Performed by: NURSE ANESTHETIST, CERTIFIED REGISTERED

## 2020-08-24 PROCEDURE — 46255 PR HEMORRHOIDECTOMY,INT/EXT,1 COLUMN/GROUP: ICD-10-PCS | Mod: ,,, | Performed by: COLON & RECTAL SURGERY

## 2020-08-24 PROCEDURE — 88304 TISSUE EXAM BY PATHOLOGIST: CPT | Mod: 26,,, | Performed by: PATHOLOGY

## 2020-08-24 PROCEDURE — 88304 PR  SURG PATH,LEVEL III: ICD-10-PCS | Mod: 26,,, | Performed by: PATHOLOGY

## 2020-08-24 PROCEDURE — 99999 PR PBB SHADOW E&M-EST. PATIENT-LVL IV: ICD-10-PCS | Mod: PBBFAC,,, | Performed by: COLON & RECTAL SURGERY

## 2020-08-24 PROCEDURE — C9290 INJ, BUPIVACAINE LIPOSOME: HCPCS | Performed by: COLON & RECTAL SURGERY

## 2020-08-24 PROCEDURE — 99024 POSTOP FOLLOW-UP VISIT: CPT | Mod: S$GLB,,, | Performed by: COLON & RECTAL SURGERY

## 2020-08-24 PROCEDURE — 27201423 OPTIME MED/SURG SUP & DEVICES STERILE SUPPLY: Performed by: COLON & RECTAL SURGERY

## 2020-08-24 PROCEDURE — 99999 PR PBB SHADOW E&M-EST. PATIENT-LVL IV: CPT | Mod: PBBFAC,,, | Performed by: COLON & RECTAL SURGERY

## 2020-08-24 PROCEDURE — 63600175 PHARM REV CODE 636 W HCPCS: Performed by: ANESTHESIOLOGY

## 2020-08-24 RX ORDER — ROCURONIUM BROMIDE 10 MG/ML
INJECTION, SOLUTION INTRAVENOUS
Status: DISCONTINUED | OUTPATIENT
Start: 2020-08-24 | End: 2020-08-24

## 2020-08-24 RX ORDER — SODIUM CHLORIDE 9 MG/ML
INJECTION, SOLUTION INTRAVENOUS CONTINUOUS
Status: DISCONTINUED | OUTPATIENT
Start: 2020-08-24 | End: 2020-08-24 | Stop reason: HOSPADM

## 2020-08-24 RX ORDER — FENTANYL CITRATE 50 UG/ML
INJECTION, SOLUTION INTRAMUSCULAR; INTRAVENOUS
Status: DISCONTINUED | OUTPATIENT
Start: 2020-08-24 | End: 2020-08-24

## 2020-08-24 RX ORDER — SODIUM CHLORIDE 9 MG/ML
INJECTION, SOLUTION INTRAVENOUS CONTINUOUS
Status: CANCELLED | OUTPATIENT
Start: 2020-08-24

## 2020-08-24 RX ORDER — MEPERIDINE HYDROCHLORIDE 50 MG/ML
12.5 INJECTION INTRAMUSCULAR; INTRAVENOUS; SUBCUTANEOUS ONCE
Status: COMPLETED | OUTPATIENT
Start: 2020-08-24 | End: 2020-08-24

## 2020-08-24 RX ORDER — LIDOCAINE HYDROCHLORIDE 20 MG/ML
INJECTION INTRAVENOUS
Status: DISCONTINUED | OUTPATIENT
Start: 2020-08-24 | End: 2020-08-24

## 2020-08-24 RX ORDER — HYDROMORPHONE HYDROCHLORIDE 1 MG/ML
0.2 INJECTION, SOLUTION INTRAMUSCULAR; INTRAVENOUS; SUBCUTANEOUS EVERY 5 MIN PRN
Status: DISCONTINUED | OUTPATIENT
Start: 2020-08-24 | End: 2020-08-24 | Stop reason: HOSPADM

## 2020-08-24 RX ORDER — MIDAZOLAM HYDROCHLORIDE 1 MG/ML
INJECTION, SOLUTION INTRAMUSCULAR; INTRAVENOUS
Status: DISCONTINUED | OUTPATIENT
Start: 2020-08-24 | End: 2020-08-24

## 2020-08-24 RX ORDER — DEXAMETHASONE SODIUM PHOSPHATE 4 MG/ML
INJECTION, SOLUTION INTRA-ARTICULAR; INTRALESIONAL; INTRAMUSCULAR; INTRAVENOUS; SOFT TISSUE
Status: DISCONTINUED | OUTPATIENT
Start: 2020-08-24 | End: 2020-08-24

## 2020-08-24 RX ORDER — OXYCODONE AND ACETAMINOPHEN 5; 325 MG/1; MG/1
1-2 TABLET ORAL EVERY 4 HOURS PRN
Qty: 40 TABLET | Refills: 0 | Status: SHIPPED | OUTPATIENT
Start: 2020-08-24 | End: 2023-03-25

## 2020-08-24 RX ORDER — SODIUM CHLORIDE 0.9 % (FLUSH) 0.9 %
3 SYRINGE (ML) INJECTION
Status: DISCONTINUED | OUTPATIENT
Start: 2020-08-24 | End: 2020-08-24 | Stop reason: HOSPADM

## 2020-08-24 RX ORDER — ACETAMINOPHEN 10 MG/ML
INJECTION, SOLUTION INTRAVENOUS
Status: DISCONTINUED | OUTPATIENT
Start: 2020-08-24 | End: 2020-08-24

## 2020-08-24 RX ORDER — FENTANYL CITRATE 50 UG/ML
25 INJECTION, SOLUTION INTRAMUSCULAR; INTRAVENOUS EVERY 5 MIN PRN
Status: DISCONTINUED | OUTPATIENT
Start: 2020-08-24 | End: 2020-08-24 | Stop reason: HOSPADM

## 2020-08-24 RX ORDER — ONDANSETRON 2 MG/ML
INJECTION INTRAMUSCULAR; INTRAVENOUS
Status: DISCONTINUED | OUTPATIENT
Start: 2020-08-24 | End: 2020-08-24

## 2020-08-24 RX ORDER — MUPIROCIN 20 MG/G
OINTMENT TOPICAL
Status: DISCONTINUED | OUTPATIENT
Start: 2020-08-24 | End: 2020-08-24 | Stop reason: HOSPADM

## 2020-08-24 RX ORDER — ONDANSETRON 2 MG/ML
4 INJECTION INTRAMUSCULAR; INTRAVENOUS DAILY PRN
Status: DISCONTINUED | OUTPATIENT
Start: 2020-08-24 | End: 2020-08-24 | Stop reason: HOSPADM

## 2020-08-24 RX ORDER — MUPIROCIN 20 MG/G
OINTMENT TOPICAL
Status: CANCELLED | OUTPATIENT
Start: 2020-08-24

## 2020-08-24 RX ORDER — PROPOFOL 10 MG/ML
VIAL (ML) INTRAVENOUS
Status: DISCONTINUED | OUTPATIENT
Start: 2020-08-24 | End: 2020-08-24

## 2020-08-24 RX ADMIN — FENTANYL CITRATE 50 MCG: 50 INJECTION, SOLUTION INTRAMUSCULAR; INTRAVENOUS at 04:08

## 2020-08-24 RX ADMIN — DEXAMETHASONE SODIUM PHOSPHATE 4 MG: 4 INJECTION, SOLUTION INTRAMUSCULAR; INTRAVENOUS at 04:08

## 2020-08-24 RX ADMIN — FENTANYL CITRATE 25 MCG: 50 INJECTION, SOLUTION INTRAMUSCULAR; INTRAVENOUS at 05:08

## 2020-08-24 RX ADMIN — SODIUM CHLORIDE, SODIUM GLUCONATE, SODIUM ACETATE, POTASSIUM CHLORIDE, MAGNESIUM CHLORIDE, SODIUM PHOSPHATE, DIBASIC, AND POTASSIUM PHOSPHATE: .53; .5; .37; .037; .03; .012; .00082 INJECTION, SOLUTION INTRAVENOUS at 05:08

## 2020-08-24 RX ADMIN — SUGAMMADEX 200 MG: 100 INJECTION, SOLUTION INTRAVENOUS at 05:08

## 2020-08-24 RX ADMIN — MIDAZOLAM HYDROCHLORIDE 2 MG: 1 INJECTION, SOLUTION INTRAMUSCULAR; INTRAVENOUS at 04:08

## 2020-08-24 RX ADMIN — FENTANYL CITRATE 25 MCG: 50 INJECTION INTRAMUSCULAR; INTRAVENOUS at 07:08

## 2020-08-24 RX ADMIN — PROPOFOL 150 MG: 10 INJECTION, EMULSION INTRAVENOUS at 04:08

## 2020-08-24 RX ADMIN — ROCURONIUM BROMIDE 40 MG: 10 INJECTION, SOLUTION INTRAVENOUS at 04:08

## 2020-08-24 RX ADMIN — FENTANYL CITRATE 50 MCG: 50 INJECTION, SOLUTION INTRAMUSCULAR; INTRAVENOUS at 05:08

## 2020-08-24 RX ADMIN — ACETAMINOPHEN 1000 MG: 10 INJECTION, SOLUTION INTRAVENOUS at 04:08

## 2020-08-24 RX ADMIN — ONDANSETRON 4 MG: 2 INJECTION, SOLUTION INTRAMUSCULAR; INTRAVENOUS at 04:08

## 2020-08-24 RX ADMIN — LIDOCAINE HYDROCHLORIDE 100 MG: 20 INJECTION, SOLUTION INTRAVENOUS at 04:08

## 2020-08-24 RX ADMIN — MEPERIDINE HYDROCHLORIDE 12.5 MG: 50 INJECTION INTRAMUSCULAR; INTRAVENOUS; SUBCUTANEOUS at 06:08

## 2020-08-24 NOTE — BRIEF OP NOTE
[facility]  Brief Operative Note    Surgery Date: 8/24/2020     Surgeon(s) and Role:     * Joon Baker MD - Primary     * Lon Espinoza MD - Fellow    Assisting Surgeon: None    Pre-op Diagnosis:  Internal and external thrombosed hemorrhoids [K64.5]    Post-op Diagnosis:  Post-Op Diagnosis Codes:     * Internal and external thrombosed hemorrhoids [K64.5]    Procedure(s) (LRB):  HEMORRHOIDECTOMY, Internal and external x1 column (N/A)    Anesthesia: General    Description of the findings of the procedure(s): thrombosed left lateral internal hemorrhoid    Estimated Blood Loss: * No values recorded between 8/24/2020  5:05 PM and 8/24/2020  5:38 PM *         Specimens:   Specimen (12h ago, onward)    None            Discharge Note    OUTCOME: Patient tolerated treatment/procedure well without complication and is now ready for discharge.    DISPOSITION: Home or Self Care    FINAL DIAGNOSIS:  Thrombosis of left lateral internal hemorrhoid    FOLLOWUP: In clinic with Dr. Baker in 4 weeks    DISCHARGE INSTRUCTIONS:  No discharge procedures on file.

## 2020-08-24 NOTE — TRANSFER OF CARE
Anesthesia Transfer of Care Note    Patient: Joe Stephens    Procedure(s) Performed: Procedure(s) (LRB):  HEMORRHOIDECTOMY, Internal and external x1 column (N/A)    Patient location: [facility]    Anesthesia Type: general    Transport from OR: Transported from OR on 6-10 L/min O2 by face mask with adequate spontaneous ventilation    Post pain: adequate analgesia    Post assessment: no apparent anesthetic complications and tolerated procedure well    Post vital signs: stable    Level of consciousness: responds to stimulation    Nausea/Vomiting: no nausea/vomiting    Complications: none    Transfer of care protocol was followed      Last vitals:   Visit Vitals  /73 (BP Location: Left arm, Patient Position: Lying)   Pulse (!) 53   Temp 37 °C (98.6 °F) (Oral)   Resp 18   Wt 71.7 kg (158 lb)   SpO2 97%   BMI 21.43 kg/m²

## 2020-08-24 NOTE — PLAN OF CARE
Patient and SO state they are ready to be discharged. Instructions and prescription (delivered to bedside) given to patient and family. Both verbalize understanding. Patient tolerating po liquids with no difficulty. Patient states pain is at a tolerable level for them. Anesthesia consent and surgical consent in chart upon patient's discharge from [facility].

## 2020-08-24 NOTE — ANESTHESIA PREPROCEDURE EVALUATION
08/24/2020  Joe Stephens is a 30 y.o., male.  Pre-operative evaluation for Procedure(s) (LRB):  HEMORRHOIDECTOMY (N/A)    Joe Stephens is a 30 y.o. male     Patient Active Problem List   Diagnosis    Internal hemorrhoids with complication    Internal and external thrombosed hemorrhoids       Review of patient's allergies indicates:   Allergen Reactions    Sulfa (sulfonamide antibiotics)        Current Facility-Administered Medications on File Prior to Encounter   Medication Dose Route Frequency Provider Last Rate Last Dose    0.9%  NaCl infusion   Intravenous Continuous Brittney Santos NP 70 mL/hr at 08/21/20 0948      mupirocin 2 % ointment   Nasal On Call Procedure Brittney Santos NP         Current Outpatient Medications on File Prior to Encounter   Medication Sig Dispense Refill    ibuprofen (ADVIL,MOTRIN) 800 MG tablet Take 1 tablet (800 mg total) by mouth 3 (three) times daily. for 10 days 30 tablet 0    multivitamin (ONE DAILY MULTIVITAMIN) per tablet Take 1 tablet by mouth once daily.      oxyCODONE-acetaminophen (PERCOCET) 5-325 mg per tablet Take 1 tablet by mouth every 4 (four) hours as needed for Pain. 25 tablet 0    polyethylene glycol (GLYCOLAX) 17 gram PwPk Mix 1 packet (17 g) with liquid and take by mouth once daily. for 20 days 20 packet 0       Past Surgical History:   Procedure Laterality Date    COLONOSCOPY  2010    EXCISIONAL HEMORRHOIDECTOMY N/A 8/21/2020    Procedure: HEMORRHOIDECTOMY: internal and external x1 column;  Surgeon: Joon Baker MD;  Location: [facility] OR [address] [place] [address];  Service: Colon and Rectal;  Laterality: N/A;       Social History     Socioeconomic History    Marital status: Single     Spouse name: Not on file    Number of children: Not on file    Years of education: Not on file    Highest education level: Not on file   Occupational History    Not  on file   Social Needs    Financial resource strain: Not on file    Food insecurity     Worry: Not on file     Inability: Not on file    Transportation needs     Medical: Not on file     Non-medical: Not on file   Tobacco Use    Smoking status: Never Smoker   Substance and Sexual Activity    Alcohol use: Not on file    Drug use: Not on file    Sexual activity: Not on file   Lifestyle    Physical activity     Days per week: Not on file     Minutes per session: Not on file    Stress: Not on file   Relationships    Social connections     Talks on phone: Not on file     Gets together: Not on file     Attends Nondenominational service: Not on file     Active member of club or organization: Not on file     Attends meetings of clubs or organizations: Not on file     Relationship status: Not on file   Other Topics Concern    Not on file   Social History Narrative    Not on file         CBC: No results for input(s): WBC, RBC, HGB, HCT, PLT, MCV, MCH, MCHC in the last 72 hours.    CMP: No results for input(s): NA, K, CL, CO2, BUN, CREATININE, GLU, MG, PHOS, CALCIUM, ALBUMIN, PROT, ALKPHOS, ALT, AST, BILITOT in the last 72 hours.    INR  No results for input(s): PT, INR, PROTIME, APTT in the last 72 hours.        Diagnostic Studies:      EKD Echo:  No results found for this or any previous visit.      Anesthesia Evaluation    I have reviewed the Patient Summary Reports.   I have reviewed the NPO Status.      Review of Systems  Anesthesia Hx:  No problems with previous Anesthesia  History of prior surgery of interest to airway management or planning: Previous anesthesia: General Denies Family Hx of Anesthesia complications.   Denies Personal Hx of Anesthesia complications.   Cardiovascular:   Exercise tolerance: good        Physical Exam  General:  Well nourished    Airway/Jaw/Neck:  Airway Findings: Mouth Opening: Normal Tongue: Normal  General Airway Assessment: Adult  Mallampati: II  Improves to II with  phonation.  TM Distance: Normal, at least 6 cm  Jaw/Neck Findings:  Neck ROM: Normal ROM      Dental:  Dental Findings: In tact   Chest/Lungs:  Chest/Lungs Findings: Clear to auscultation, Normal Respiratory Rate     Heart/Vascular:  Heart Findings: Rate: Normal  Rhythm: Regular Rhythm  Sounds: Normal        Mental Status:  Mental Status Findings:  Cooperative, Alert and Oriented         Anesthesia Plan  Type of Anesthesia, risks & benefits discussed:  Anesthesia Type:  general  Patient's Preference:   Intra-op Monitoring Plan: standard ASA monitors  Intra-op Monitoring Plan Comments:   Post Op Pain Control Plan: multimodal analgesia  Post Op Pain Control Plan Comments:   Induction:   IV  Beta Blocker:         Informed Consent: Patient understands risks and agrees with Anesthesia plan.  Questions answered. Anesthesia consent signed with patient.  ASA Score: 1     Day of Surgery Review of History & Physical:    H&P update referred to the surgeon.         Ready For Surgery From Anesthesia Perspective.

## 2020-08-24 NOTE — PROGRESS NOTES
History & Physical    SUBJECTIVE:     History of Present Illness:  Patient is a 30 y.o. male presents with anal pain and swelling.  He underwent hemorrhoidectomy 3 days ago and had been doing fine until the at a bowel movement yesterday and developed some swelling that has progressed and has worsening pain.    Chief Complaint   Patient presents with    Hemorrhoids    Follow-up       Review of patient's allergies indicates:   Allergen Reactions    Sulfa (sulfonamide antibiotics)        No current facility-administered medications for this visit.      No current outpatient medications on file.     Facility-Administered Medications Ordered in Other Visits   Medication Dose Route Frequency Provider Last Rate Last Dose    0.9%  NaCl infusion   Intravenous Continuous Brittney Santos NP 70 mL/hr at 08/21/20 0948      mupirocin 2 % ointment   Nasal On Call Procedure Brittney Santos NP           History reviewed. No pertinent past medical history.  Past Surgical History:   Procedure Laterality Date    COLONOSCOPY  2010    EXCISIONAL HEMORRHOIDECTOMY N/A 8/21/2020    Procedure: HEMORRHOIDECTOMY: internal and external x1 column;  Surgeon: Joon Baker MD;  Location: [facility] OR [address] [place] [address];  Service: Colon and Rectal;  Laterality: N/A;     History reviewed. No pertinent family history.  Social History     Tobacco Use    Smoking status: Never Smoker   Substance Use Topics    Alcohol use: Not on file    Drug use: Not on file        Review of Systems:  Review of Systems   Constitutional: Negative for chills and fever.   Respiratory: Negative for cough and shortness of breath.    Cardiovascular: Negative for chest pain and palpitations.   Gastrointestinal: Negative for nausea and vomiting.   Genitourinary: Negative for dysuria and urgency.   Neurological: Negative for seizures and numbness.       OBJECTIVE:     Vital Signs (Most Recent)  Pulse: 73 (08/24/20 1419)  BP: (!) 144/81 (08/24/20 1419)  6' (1.829 m)  71.9 kg  (158 lb 8.2 oz)     Physical Exam:  Physical Exam  Constitutional:       Appearance: He is well-developed.   Eyes:      Conjunctiva/sclera: Conjunctivae normal.   Pulmonary:      Effort: Pulmonary effort is normal. No respiratory distress.   Genitourinary:     Comments: Left lateral thrombosed internal external hemorrhoid.  Musculoskeletal: Normal range of motion.   Skin:     General: Skin is warm and dry.   Neurological:      Mental Status: He is alert and oriented to person, place, and time.         Laboratory  none    Diagnostic Results:  none    ASSESSMENT/PLAN:     Thrombosed internal external hemorrhoid after ligation.    PLAN:Plan     Operative hemorrhoidectomy today.I have explained the risks, benefits, and alternatives of the procedure in detail.  The patient voices understanding and all questions have been answered. The patient agrees to proceed as planned.

## 2020-08-24 NOTE — ANESTHESIA PROCEDURE NOTES
Intubation  Performed by: Evita Jain CRNA  Authorized by: Esther Garcia MD     Intubation:     Induction:  Intravenous    Intubated:  Postinduction    Mask Ventilation:  Easy mask    Attempts:  1    Attempted By:  CRNA    Method of Intubation:  Direct    Blade:  Albarado 2    Laryngeal View Grade: Grade I - full view of chords      Difficult Airway Encountered?: No      Complications:  None    Airway Device:  Oral endotracheal tube    Airway Device Size:  7.5    Style/Cuff Inflation:  Cuffed (inflated to minimal occlusive pressure)    Tube secured:  22    Placement Verified By:  Capnometry    Complicating Factors:  None    Findings Post-Intubation:  BS equal bilateral and atraumatic/condition of teeth unchanged

## 2020-08-25 LAB
FINAL PATHOLOGIC DIAGNOSIS: NORMAL
GROSS: NORMAL

## 2020-08-25 NOTE — OP NOTE
JOE STEPHENS  [id number]  [id number]    OPERATIVE NOTE:    DATE OF OPERATION: 08/24/2020    PREOPERATIVE DIAGNOSIS:  Thrombosed internal external hemorrhoid    POSTOPERATIVE DIAGNOSIS:  Thrombosed internal external hemorrhoid    PROCEDURE PERFORMED:  Internal external hemorrhoidectomy x1    ATTENDING SURGEON: Joon Baker MD    RESIDENT/FELLOW SURGEON:  Lon Espinoza MD    ANESTHESIA:  General endotracheal    ESTIMATED BLOOD LOSS:  50 mL    FINDINGS:  1.  Thrombosed internal external hemorrhoid    SPECIMENS:   1. Left lateral hemorrhoid    COMPLICATIONS:  None    DISPOSITION: PACU    CONDITION: good    INDICATION FOR PROCEDURE:  Joe Stephens is a 30 y.o. male who 3 days ago underwent single column hemorrhoidectomy and single column suture ligation.  The column the left lateral that underwent suture ligation has thrombosed and has become more painful.  He has consented for excisional hemorrhoidectomy of this column.    DESCRIPTION OF PROCEDURE:   After obtaining informed consent the patient was taken the operating room and placed in the supine position.  He underwent general endotracheal anesthesia and then was placed prone casie-knife.  He was then prepped draped in the sterile manner.  A preoperative time-out was performed.     Inspection and anoscopy revealed a large thrombosed combined internal-external hemorrhoid.  The remaining internal hemorrhoids while friable did not have evidence of prolapse.  A standard elliptical excisional hemorrhoidectomy was performed.  A 3 0 chromic was placed at the apex of the hemorrhoid and secured with a figure-of-eight transfixing stitch.  Using electrocautery the hemorrhoid was excised in elliptical fashion taking care to dissect the hemorrhoid tissue off of the underlying internal sphincter.    Once the hemorrhoid was completely excised mucocutaneous closure was performed using a running 3 0 chromic.  Hemostasis was ensured and the anal canal was packed with  Gelfoam.  We examined the previous hemorrhoidectomy site which was intact and there was no bleeding from that site.     The patient tolerated the procedure and was taken to the recovery room in stable condition.        Joon Baker MD  , Colon & Rectal Surgery

## 2020-08-25 NOTE — ANESTHESIA POSTPROCEDURE EVALUATION
Anesthesia Post Evaluation    Patient: Joe Stephens    Procedure(s) Performed: Procedure(s) (LRB):  HEMORRHOIDECTOMY, Internal and external x1 column (N/A)    Final Anesthesia Type: general    Patient location during evaluation: PACU  Patient participation: Yes- Able to Participate  Level of consciousness: awake and alert  Post-procedure vital signs: reviewed and stable  Pain management: adequate  Airway patency: patent    PONV status at discharge: No PONV  Anesthetic complications: no      Cardiovascular status: blood pressure returned to baseline  Respiratory status: unassisted  Hydration status: euvolemic  Follow-up not needed.          Vitals Value Taken Time   /84 08/24/20 1945   Temp 36.8 °C (98.2 °F) 08/24/20 1945   Pulse 62 08/24/20 1945   Resp 18 08/24/20 1945   SpO2 100 % 08/24/20 1945         No case tracking events are documented in the log.      Pain/Konrad Score: Pain Rating Prior to Med Admin: 6 (8/24/2020  7:03 PM)

## 2020-08-27 LAB
FINAL PATHOLOGIC DIAGNOSIS: NORMAL
GROSS: NORMAL

## 2020-08-27 NOTE — H&P
History & Physical     SUBJECTIVE:      History of Present Illness:  Patient is a 30 y.o. male presents with anal pain and swelling.  He underwent hemorrhoidectomy 3 days ago and had been doing fine until the at a bowel movement yesterday and developed some swelling that has progressed and has worsening pain.         Chief Complaint   Patient presents with    Hemorrhoids    Follow-up              Review of patient's allergies indicates:   Allergen Reactions    Sulfa (sulfonamide antibiotics)           Current Medications   No current facility-administered medications for this visit.       No current outpatient medications on file.                Facility-Administered Medications Ordered in Other Visits   Medication Dose Route Frequency Provider Last Rate Last Dose    0.9%  NaCl infusion   Intravenous Continuous Brittney Santos NP 70 mL/hr at 08/21/20 0948      mupirocin 2 % ointment   Nasal On Call Procedure Brittney Santos NP               History reviewed. No pertinent past medical history.        Past Surgical History:   Procedure Laterality Date    COLONOSCOPY   2010    EXCISIONAL HEMORRHOIDECTOMY N/A 8/21/2020     Procedure: HEMORRHOIDECTOMY: internal and external x1 column;  Surgeon: Joon Baker MD;  Location: [facility] OR [address] [place] [address];  Service: Colon and Rectal;  Laterality: N/A;      History reviewed. No pertinent family history.  Social History           Tobacco Use    Smoking status: Never Smoker   Substance Use Topics    Alcohol use: Not on file    Drug use: Not on file         Review of Systems:  Review of Systems   Constitutional: Negative for chills and fever.   Respiratory: Negative for cough and shortness of breath.    Cardiovascular: Negative for chest pain and palpitations.   Gastrointestinal: Negative for nausea and vomiting.   Genitourinary: Negative for dysuria and urgency.   Neurological: Negative for seizures and numbness.         OBJECTIVE:      Vital Signs (Most Recent)  Pulse:  73 (08/24/20 1419)  BP: (!) 144/81 (08/24/20 1419)  6' (1.829 m)  71.9 kg (158 lb 8.2 oz)      Physical Exam:  Physical Exam  Constitutional:       Appearance: He is well-developed.   Eyes:      Conjunctiva/sclera: Conjunctivae normal.   Pulmonary:      Effort: Pulmonary effort is normal. No respiratory distress.   Genitourinary:     Comments: Left lateral thrombosed internal external hemorrhoid.  Musculoskeletal: Normal range of motion.   Skin:     General: Skin is warm and dry.   Neurological:      Mental Status: He is alert and oriented to person, place, and time.          Laboratory  none     Diagnostic Results:  none     ASSESSMENT/PLAN:      Thrombosed internal external hemorrhoid after ligation.     PLAN:Plan      Operative hemorrhoidectomy today.I have explained the risks, benefits, and alternatives of the procedure in detail.  The patient voices understanding and all questions have been answered. The patient agrees to proceed as planned.

## 2020-08-28 ENCOUNTER — OFFICE VISIT (OUTPATIENT)
Dept: OPTOMETRY | Facility: CLINIC | Age: 30
End: 2020-08-28
Payer: COMMERCIAL

## 2020-08-28 DIAGNOSIS — H52.13 MYOPIA OF BOTH EYES: Primary | ICD-10-CM

## 2020-08-28 PROCEDURE — 92015 DETERMINE REFRACTIVE STATE: CPT | Mod: S$GLB,,, | Performed by: OPTOMETRIST

## 2020-08-28 PROCEDURE — 92310 CONTACT LENS FITTING OU: CPT | Mod: CSM,S$GLB,, | Performed by: OPTOMETRIST

## 2020-08-28 PROCEDURE — 92004 COMPRE OPH EXAM NEW PT 1/>: CPT | Mod: S$GLB,,, | Performed by: OPTOMETRIST

## 2020-08-28 PROCEDURE — 92004 PR EYE EXAM, NEW PATIENT,COMPREHESV: ICD-10-PCS | Mod: S$GLB,,, | Performed by: OPTOMETRIST

## 2020-08-28 PROCEDURE — 92015 PR REFRACTION: ICD-10-PCS | Mod: S$GLB,,, | Performed by: OPTOMETRIST

## 2020-08-28 PROCEDURE — 92310 PR CONTACT LENS FITTING (NO CHANGE): ICD-10-PCS | Mod: CSM,S$GLB,, | Performed by: OPTOMETRIST

## 2020-08-28 PROCEDURE — 99999 PR PBB SHADOW E&M-EST. PATIENT-LVL III: ICD-10-PCS | Mod: PBBFAC,,, | Performed by: OPTOMETRIST

## 2020-08-28 PROCEDURE — 99999 PR PBB SHADOW E&M-EST. PATIENT-LVL III: CPT | Mod: PBBFAC,,, | Performed by: OPTOMETRIST

## 2020-08-28 NOTE — PROGRESS NOTES
HPI     Annual eye exam and contact fit  DW, 1 mo, Opti-Free  (-)Flashes (-)Floaters  (-)Itch, (-)tear, (-)burn, (--)Dryness. (-) OTC Drops   (-)Photophobia  (-)Glare (-)diplopia (--) headaches          Last edited by Denton Leahy, OD on 8/28/2020  1:40 PM. (History)            Assessment /Plan     For exam results, see Encounter Report.    Myopia of both eyes  Eyeglass Final Rx     Eyeglass Final Rx       Sphere Cylinder Axis    Right -9.75 +0.75 080    Left -9.75 +0.50 085    Type: SVL    Expiration Date: 8/29/2021            Trial Air Optix vs Ultra  1 wk PHREV  Puremoist    RTC 1 yr

## 2020-09-09 ENCOUNTER — TELEPHONE (OUTPATIENT)
Dept: OPTOMETRY | Facility: CLINIC | Age: 30
End: 2020-09-09

## 2020-09-09 NOTE — TELEPHONE ENCOUNTER
Final  Contact Lens Final Rx     Final Contact Lens Rx       Brand Base Curve Diameter Sphere Cylinder    Right [employer] Ultra 8.60 14.2 -8.50 Sphere    Left [employer] Ultra 8.60 14.2 -8.50 Sphere    Expiration Date: 9/10/2021    Replacement: Monthly    Solutions: OptiFree PureMoist    Wearing Schedule: Daily wear

## 2020-09-30 ENCOUNTER — TELEPHONE (OUTPATIENT)
Dept: SURGERY | Facility: CLINIC | Age: 30
End: 2020-09-30

## 2020-10-01 ENCOUNTER — OFFICE VISIT (OUTPATIENT)
Dept: SURGERY | Facility: CLINIC | Age: 30
End: 2020-10-01
Payer: COMMERCIAL

## 2020-10-01 VITALS
WEIGHT: 154.31 LBS | HEIGHT: 72 IN | SYSTOLIC BLOOD PRESSURE: 100 MMHG | HEART RATE: 85 BPM | BODY MASS INDEX: 20.9 KG/M2 | DIASTOLIC BLOOD PRESSURE: 80 MMHG

## 2020-10-01 DIAGNOSIS — Z09 POSTOP CHECK: ICD-10-CM

## 2020-10-01 DIAGNOSIS — K64.8 INTERNAL AND EXTERNAL THROMBOSED HEMORRHOIDS: Primary | ICD-10-CM

## 2020-10-01 DIAGNOSIS — K64.5 INTERNAL AND EXTERNAL THROMBOSED HEMORRHOIDS: Primary | ICD-10-CM

## 2020-10-01 PROCEDURE — 99024 PR POST-OP FOLLOW-UP VISIT: ICD-10-PCS | Mod: S$GLB,,, | Performed by: COLON & RECTAL SURGERY

## 2020-10-01 PROCEDURE — 99024 POSTOP FOLLOW-UP VISIT: CPT | Mod: S$GLB,,, | Performed by: COLON & RECTAL SURGERY

## 2020-10-01 PROCEDURE — 99999 PR PBB SHADOW E&M-EST. PATIENT-LVL III: ICD-10-PCS | Mod: PBBFAC,,, | Performed by: COLON & RECTAL SURGERY

## 2020-10-01 PROCEDURE — 99999 PR PBB SHADOW E&M-EST. PATIENT-LVL III: CPT | Mod: PBBFAC,,, | Performed by: COLON & RECTAL SURGERY

## 2020-10-01 NOTE — PROGRESS NOTES
Status post hemorrhoidectomy on 21 August 2020 complicated by thrombosis of an area that had been ligated and return to the OR on 24 August 2020.  Presents today for follow-up.  Has some mucus drainage.  Infrequent spotting of blood.  Pain of 2 to 3/10 following bowel movements which she describes mostly as a spasm.  Taking Metamucil daily.  Overall happy with results.    Exam:Blood pressure 100/80, pulse 85, height 6' (1.829 m), weight 70 kg (154 lb 4.8 oz).    Well-appearing  Anorectal exam.  Wounds healing, some induration around the left lateral.    Assessment/ plan: doing well postop.  Follow-up 1 month.  I have instruction to just give me an update if he is free of mucus drainage, bleeding and pain in 1 month. If he still having any of those symptoms then I would like to see him in person.

## 2020-11-05 ENCOUNTER — OFFICE VISIT (OUTPATIENT)
Dept: SURGERY | Facility: CLINIC | Age: 30
End: 2020-11-05
Payer: COMMERCIAL

## 2020-11-05 VITALS
SYSTOLIC BLOOD PRESSURE: 118 MMHG | HEIGHT: 72 IN | BODY MASS INDEX: 21.17 KG/M2 | DIASTOLIC BLOOD PRESSURE: 78 MMHG | WEIGHT: 156.31 LBS

## 2020-11-05 DIAGNOSIS — K60.2 FISSURE IN ANO: Primary | ICD-10-CM

## 2020-11-05 PROCEDURE — 99024 PR POST-OP FOLLOW-UP VISIT: ICD-10-PCS | Mod: S$GLB,,, | Performed by: COLON & RECTAL SURGERY

## 2020-11-05 PROCEDURE — 99999 PR PBB SHADOW E&M-EST. PATIENT-LVL III: CPT | Mod: PBBFAC,,, | Performed by: COLON & RECTAL SURGERY

## 2020-11-05 PROCEDURE — 99999 PR PBB SHADOW E&M-EST. PATIENT-LVL III: ICD-10-PCS | Mod: PBBFAC,,, | Performed by: COLON & RECTAL SURGERY

## 2020-11-05 PROCEDURE — 99024 POSTOP FOLLOW-UP VISIT: CPT | Mod: S$GLB,,, | Performed by: COLON & RECTAL SURGERY

## 2020-11-05 NOTE — PROGRESS NOTES
Subjective:       Patient ID: Joe Stephens is a 30 y.o. male.    Chief Complaint: Post-op Evaluation    HPI established patient.  Status post hemorrhoidectomy on 21 August followed by return to the OR on 24 August for thrombosis.  He is doing well postop but continues to have some mild mucus and occasional bleeding.  Overall he is markedly improved    Review of Systems   Constitutional: Negative for chills and fever.   Respiratory: Negative for cough and shortness of breath.    Cardiovascular: Negative for chest pain and palpitations.   Gastrointestinal: Negative for nausea and vomiting.   Genitourinary: Negative for dysuria and urgency.   Neurological: Negative for seizures and numbness.         Objective:      Physical Exam  Genitourinary:     Comments: He still has 2 fairly superficial small fissures in the area of his hemorrhoidectomy incisions.  There is no hypertrophic granulation tissue any tolerates digital rectal exam.        Assessment:       1. Fissure in ano        Plan:     plan is continue on his fiber and add some Colace additional Cosmo and had some diltiazem ointment for him use several times a day for couple weeks.  He is to contact me in 4-6 weeks to see if he is asymptomatic that point

## 2020-12-17 ENCOUNTER — IMMUNIZATION (OUTPATIENT)
Dept: INTERNAL MEDICINE | Facility: CLINIC | Age: 30
End: 2020-12-17
Payer: COMMERCIAL

## 2020-12-17 DIAGNOSIS — Z23 NEED FOR VACCINATION: ICD-10-CM

## 2020-12-17 PROCEDURE — 91300 COVID-19, MRNA, LNP-S, PF, 30 MCG/0.3 ML DOSE VACCINE: CPT | Mod: ,,, | Performed by: INTERNAL MEDICINE

## 2020-12-17 PROCEDURE — 0001A COVID-19, MRNA, LNP-S, PF, 30 MCG/0.3 ML DOSE VACCINE: ICD-10-PCS | Mod: CV19,,, | Performed by: INTERNAL MEDICINE

## 2020-12-17 PROCEDURE — 91300 COVID-19, MRNA, LNP-S, PF, 30 MCG/0.3 ML DOSE VACCINE: ICD-10-PCS | Mod: ,,, | Performed by: INTERNAL MEDICINE

## 2020-12-17 PROCEDURE — 0001A COVID-19, MRNA, LNP-S, PF, 30 MCG/0.3 ML DOSE VACCINE: CPT | Mod: CV19,,, | Performed by: INTERNAL MEDICINE

## 2021-01-07 ENCOUNTER — IMMUNIZATION (OUTPATIENT)
Dept: INTERNAL MEDICINE | Facility: CLINIC | Age: 31
End: 2021-01-07
Payer: COMMERCIAL

## 2021-01-07 DIAGNOSIS — Z23 NEED FOR VACCINATION: ICD-10-CM

## 2021-01-07 PROCEDURE — 91300 COVID-19, MRNA, LNP-S, PF, 30 MCG/0.3 ML DOSE VACCINE: CPT | Mod: PBBFAC | Performed by: INTERNAL MEDICINE

## 2021-01-07 PROCEDURE — 0002A COVID-19, MRNA, LNP-S, PF, 30 MCG/0.3 ML DOSE VACCINE: CPT | Mod: PBBFAC | Performed by: INTERNAL MEDICINE

## 2021-02-22 ENCOUNTER — PATIENT MESSAGE (OUTPATIENT)
Dept: OPTOMETRY | Facility: CLINIC | Age: 31
End: 2021-02-22

## 2021-02-26 ENCOUNTER — OFFICE VISIT (OUTPATIENT)
Dept: OPTOMETRY | Facility: CLINIC | Age: 31
End: 2021-02-26
Payer: COMMERCIAL

## 2021-02-26 DIAGNOSIS — H52.13 MYOPIA OF BOTH EYES: Primary | ICD-10-CM

## 2021-02-26 PROCEDURE — 99499 NO LOS: ICD-10-PCS | Mod: S$GLB,,, | Performed by: OPTOMETRIST

## 2021-02-26 PROCEDURE — 99499 UNLISTED E&M SERVICE: CPT | Mod: S$GLB,,, | Performed by: OPTOMETRIST

## 2021-03-05 ENCOUNTER — TELEPHONE (OUTPATIENT)
Dept: OPTOMETRY | Facility: CLINIC | Age: 31
End: 2021-03-05

## 2021-03-05 ENCOUNTER — PATIENT MESSAGE (OUTPATIENT)
Dept: OPTOMETRY | Facility: CLINIC | Age: 31
End: 2021-03-05

## 2022-08-01 NOTE — TELEPHONE ENCOUNTER
Continue taking all the medications on this list as directed; this list is current and please discontinue any medications not on this list. Please call the office or use \"My Chart\" online to request medication refills prior to running out. It was a pleasure to see and care for you this visit; I look forward to seeing you next time. Also please remember that we keep \"Urgent Care\" visit slots in reserve every day for any acute illness or injury. If you ever have an urgent issue please call our office and we should typically be able to see you within 24 hours, but most often you will be able to be seen the same day that you call. We also offer \"Virtual Visits\" that can be done over a video connection, or regular phone call if you don't have a video connection. Left location of OV for 10/01/2020 on pt vm

## 2022-10-11 ENCOUNTER — OCCUPATIONAL HEALTH (OUTPATIENT)
Dept: URGENT CARE | Facility: CLINIC | Age: 32
End: 2022-10-11

## 2022-10-11 ENCOUNTER — NURSE TRIAGE (OUTPATIENT)
Dept: ADMINISTRATIVE | Facility: CLINIC | Age: 32
End: 2022-10-11
Payer: COMMERCIAL

## 2022-10-11 DIAGNOSIS — R05.9 COUGH, UNSPECIFIED TYPE: Primary | ICD-10-CM

## 2022-10-11 LAB
CTP QC/QA: YES
SARS-COV-2 AG RESP QL IA.RAPID: NEGATIVE

## 2022-10-11 PROCEDURE — 87811 SARS-COV-2 COVID19 W/OPTIC: CPT | Mod: QW,S$GLB,, | Performed by: NURSE PRACTITIONER

## 2022-10-11 PROCEDURE — 87811 SARS CORONAVIRUS 2 ANTIGEN POCT, MANUAL READ: ICD-10-PCS | Mod: QW,S$GLB,, | Performed by: NURSE PRACTITIONER

## 2022-10-11 NOTE — TELEPHONE ENCOUNTER
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  He is asking about the protocol for [employer] employees and wants to be tested to make sure it is not COVID.  I triaged him and advised he contact his PCP for additional advice, also advised he go to the [facility] between 10 am-2 pm, check in at the side entrance and announce himself as an employee, where they would order a COVID test for him; he verbalized understanding.      Reason for Disposition   [1] COVID-19 infection suspected by caller or triager AND [2] mild symptoms (cough, fever, or others) AND [3] negative COVID-19 rapid test    Additional Information   Negative: SEVERE difficulty breathing (e.g., struggling for each breath, speaks in single words)   Negative: Difficult to awaken or acting confused (e.g., disoriented, slurred speech)   Negative: Bluish (or gray) lips or face now   Negative: Shock suspected (e.g., cold/pale/clammy skin, too weak to stand, low BP, rapid pulse)   Negative: Sounds like a life-threatening emergency to the triager   Negative: SEVERE or constant chest pain or pressure  (Exception: Mild central chest pain, present only when coughing.)   Negative: MODERATE difficulty breathing (e.g., speaks in phrases, SOB even at rest, pulse 100-120)   Negative: Headache and stiff neck (can't touch chin to chest)   Negative: Oxygen level (e.g., pulse oximetry) 90 percent or lower   Negative: Chest pain or pressure  (Exception: MILD central chest pain, present only when coughing)   Negative: Patient sounds very sick or weak to the triager   Negative: MILD difficulty breathing (e.g., minimal/no SOB at rest, SOB with walking, pulse <100)   Negative: Fever > 103 F (39.4 C)   Negative: [1] Fever > 100.0 F (37.8 C) AND [2] bedridden (e.g., nursing home patient, CVA, chronic illness,  recovering from surgery)   Negative: [1] Fever > 101 F (38.3 C) AND [2] over 60 years of age   Negative: [1] HIGH RISK for severe COVID complications (e.g., weak immune system, age > 64 years, obesity with BMI of 30 or higher, pregnant, chronic lung disease or other chronic medical condition) AND [2] COVID symptoms (e.g., cough, fever)  (Exceptions: Already seen by PCP and no new or worsening symptoms.)   Negative: [1] HIGH RISK patient AND [2] influenza is widespread in the community AND [3] ONE OR MORE respiratory symptoms: cough, sore throat, runny or stuffy nose   Negative: [1] HIGH RISK patient AND [2] influenza exposure within the last 7 days AND [3] ONE OR MORE respiratory symptoms: cough, sore throat, runny or stuffy nose   Negative: Oxygen level (e.g., pulse oximetry) 91 to 94 percent    Protocols used: Coronavirus (COVID-19) Diagnosed or [id number]

## 2022-10-11 NOTE — PROGRESS NOTES
Subjective:       Patient ID: Joe Stephens is a 32 y.o. male.    Chief Complaint: Labs Only    Pt is present for [employer] employee testing for COVID.  ROS     Objective:      Physical Exam    Assessment:       1. Cough, unspecified type          Plan:                   No follow-ups on file.

## 2023-03-25 ENCOUNTER — OFFICE VISIT (OUTPATIENT)
Dept: INTERNAL MEDICINE | Facility: CLINIC | Age: 33
End: 2023-03-25
Payer: COMMERCIAL

## 2023-03-25 ENCOUNTER — LAB VISIT (OUTPATIENT)
Dept: LAB | Facility: HOSPITAL | Age: 33
End: 2023-03-25
Attending: INTERNAL MEDICINE
Payer: COMMERCIAL

## 2023-03-25 DIAGNOSIS — Z00.00 PREVENTATIVE HEALTH CARE: ICD-10-CM

## 2023-03-25 DIAGNOSIS — Z00.00 PREVENTATIVE HEALTH CARE: Primary | ICD-10-CM

## 2023-03-25 LAB
ALBUMIN SERPL BCP-MCNC: 4.1 G/DL (ref 3.5–5.2)
ALP SERPL-CCNC: 42 U/L (ref 55–135)
ALT SERPL W/O P-5'-P-CCNC: 29 U/L (ref 10–44)
ANION GAP SERPL CALC-SCNC: 9 MMOL/L (ref 8–16)
AST SERPL-CCNC: 27 U/L (ref 10–40)
BASOPHILS # BLD AUTO: 0.04 K/UL (ref 0–0.2)
BASOPHILS NFR BLD: 0.7 % (ref 0–1.9)
BILIRUB SERPL-MCNC: 0.6 MG/DL (ref 0.1–1)
BUN SERPL-MCNC: 22 MG/DL (ref 6–20)
CALCIUM SERPL-MCNC: 10 MG/DL (ref 8.7–10.5)
CHLORIDE SERPL-SCNC: 106 MMOL/L (ref 95–110)
CHOLEST SERPL-MCNC: 139 MG/DL (ref 120–199)
CHOLEST/HDLC SERPL: 2.8 {RATIO} (ref 2–5)
CO2 SERPL-SCNC: 24 MMOL/L (ref 23–29)
CREAT SERPL-MCNC: 1.4 MG/DL (ref 0.5–1.4)
DIFFERENTIAL METHOD: ABNORMAL
EOSINOPHIL # BLD AUTO: 0.5 K/UL (ref 0–0.5)
EOSINOPHIL NFR BLD: 7.9 % (ref 0–8)
ERYTHROCYTE [DISTWIDTH] IN BLOOD BY AUTOMATED COUNT: 14.3 % (ref 11.5–14.5)
EST. GFR  (NO RACE VARIABLE): >60 ML/MIN/1.73 M^2
GLUCOSE SERPL-MCNC: 98 MG/DL (ref 70–110)
HCT VFR BLD AUTO: 42.1 % (ref 40–54)
HDLC SERPL-MCNC: 49 MG/DL (ref 40–75)
HDLC SERPL: 35.3 % (ref 20–50)
HGB BLD-MCNC: 12.7 G/DL (ref 14–18)
IMM GRANULOCYTES # BLD AUTO: 0.01 K/UL (ref 0–0.04)
IMM GRANULOCYTES NFR BLD AUTO: 0.2 % (ref 0–0.5)
LDLC SERPL CALC-MCNC: 79.4 MG/DL (ref 63–159)
LYMPHOCYTES # BLD AUTO: 3 K/UL (ref 1–4.8)
LYMPHOCYTES NFR BLD: 52.8 % (ref 18–48)
MCH RBC QN AUTO: 25.2 PG (ref 27–31)
MCHC RBC AUTO-ENTMCNC: 30.2 G/DL (ref 32–36)
MCV RBC AUTO: 84 FL (ref 82–98)
MONOCYTES # BLD AUTO: 0.5 K/UL (ref 0.3–1)
MONOCYTES NFR BLD: 9.2 % (ref 4–15)
NEUTROPHILS # BLD AUTO: 1.7 K/UL (ref 1.8–7.7)
NEUTROPHILS NFR BLD: 29.2 % (ref 38–73)
NONHDLC SERPL-MCNC: 90 MG/DL
NRBC BLD-RTO: 0 /100 WBC
PLATELET # BLD AUTO: 260 K/UL (ref 150–450)
PMV BLD AUTO: 10.9 FL (ref 9.2–12.9)
POTASSIUM SERPL-SCNC: 4.6 MMOL/L (ref 3.5–5.1)
PROT SERPL-MCNC: 7.5 G/DL (ref 6–8.4)
RBC # BLD AUTO: 5.04 M/UL (ref 4.6–6.2)
SODIUM SERPL-SCNC: 139 MMOL/L (ref 136–145)
TRIGL SERPL-MCNC: 53 MG/DL (ref 30–150)
WBC # BLD AUTO: 5.68 K/UL (ref 3.9–12.7)

## 2023-03-25 PROCEDURE — 80061 LIPID PANEL: CPT | Performed by: INTERNAL MEDICINE

## 2023-03-25 PROCEDURE — 3074F PR MOST RECENT SYSTOLIC BLOOD PRESSURE < 130 MM HG: ICD-10-PCS | Mod: CPTII,S$GLB,, | Performed by: INTERNAL MEDICINE

## 2023-03-25 PROCEDURE — 3079F DIAST BP 80-89 MM HG: CPT | Mod: CPTII,S$GLB,, | Performed by: INTERNAL MEDICINE

## 2023-03-25 PROCEDURE — 85025 COMPLETE CBC W/AUTO DIFF WBC: CPT | Performed by: INTERNAL MEDICINE

## 2023-03-25 PROCEDURE — 3079F PR MOST RECENT DIASTOLIC BLOOD PRESSURE 80-89 MM HG: ICD-10-PCS | Mod: CPTII,S$GLB,, | Performed by: INTERNAL MEDICINE

## 2023-03-25 PROCEDURE — 3008F PR BODY MASS INDEX (BMI) DOCUMENTED: ICD-10-PCS | Mod: CPTII,S$GLB,, | Performed by: INTERNAL MEDICINE

## 2023-03-25 PROCEDURE — 3074F SYST BP LT 130 MM HG: CPT | Mod: CPTII,S$GLB,, | Performed by: INTERNAL MEDICINE

## 2023-03-25 PROCEDURE — 99385 PR PREVENTIVE VISIT,NEW,18-39: ICD-10-PCS | Mod: S$GLB,,, | Performed by: INTERNAL MEDICINE

## 2023-03-25 PROCEDURE — 99999 PR PBB SHADOW E&M-EST. PATIENT-LVL III: CPT | Mod: PBBFAC,,, | Performed by: INTERNAL MEDICINE

## 2023-03-25 PROCEDURE — 36415 COLL VENOUS BLD VENIPUNCTURE: CPT | Performed by: INTERNAL MEDICINE

## 2023-03-25 PROCEDURE — 1159F PR MEDICATION LIST DOCUMENTED IN MEDICAL RECORD: ICD-10-PCS | Mod: CPTII,S$GLB,, | Performed by: INTERNAL MEDICINE

## 2023-03-25 PROCEDURE — 80053 COMPREHEN METABOLIC PANEL: CPT | Performed by: INTERNAL MEDICINE

## 2023-03-25 PROCEDURE — 3008F BODY MASS INDEX DOCD: CPT | Mod: CPTII,S$GLB,, | Performed by: INTERNAL MEDICINE

## 2023-03-25 PROCEDURE — 1159F MED LIST DOCD IN RCRD: CPT | Mod: CPTII,S$GLB,, | Performed by: INTERNAL MEDICINE

## 2023-03-25 PROCEDURE — 99385 PREV VISIT NEW AGE 18-39: CPT | Mod: S$GLB,,, | Performed by: INTERNAL MEDICINE

## 2023-03-25 PROCEDURE — 99999 PR PBB SHADOW E&M-EST. PATIENT-LVL III: ICD-10-PCS | Mod: PBBFAC,,, | Performed by: INTERNAL MEDICINE

## 2023-03-26 VITALS
HEIGHT: 72 IN | BODY MASS INDEX: 22.33 KG/M2 | DIASTOLIC BLOOD PRESSURE: 80 MMHG | SYSTOLIC BLOOD PRESSURE: 128 MMHG | OXYGEN SATURATION: 100 % | WEIGHT: 164.88 LBS | HEART RATE: 74 BPM | TEMPERATURE: 99 F

## 2023-03-26 NOTE — PROGRESS NOTES
Subjective:       Patient ID: Joe Stephens is a 32 y.o. male.    Chief Complaint: Establish Care    HPI  He is here for an initial visit.  Currently without complaint     Past medical history:  Unremarkable     Medications:  None     Allergies: Sulfa       Review of Systems   Constitutional:  Negative for chills, fatigue, fever and unexpected weight change.   Respiratory:  Negative for chest tightness and shortness of breath.    Cardiovascular:  Negative for chest pain and palpitations.   Gastrointestinal:  Negative for abdominal pain and blood in stool.   Neurological:  Negative for dizziness, syncope, numbness and headaches.     Objective:      Physical Exam  HENT:      Right Ear: External ear normal.      Left Ear: External ear normal.      Nose: Nose normal.      Mouth/Throat:      Mouth: Mucous membranes are moist.      Pharynx: Oropharynx is clear.   Eyes:      Pupils: Pupils are equal, round, and reactive to light.   Cardiovascular:      Rate and Rhythm: Normal rate and regular rhythm.      Heart sounds: No murmur heard.  Pulmonary:      Breath sounds: Normal breath sounds.   Abdominal:      General: There is no distension.      Palpations: There is no hepatomegaly.      Tenderness: There is no abdominal tenderness.   Musculoskeletal:      Cervical back: Normal range of motion.   Lymphadenopathy:      Cervical: No cervical adenopathy.      Upper Body:      Right upper body: No axillary adenopathy.      Left upper body: No axillary adenopathy.   Neurological:      Cranial Nerves: No cranial nerve deficit.      Sensory: No sensory deficit.      Motor: Motor function is intact.      Deep Tendon Reflexes: Reflexes are normal and symmetric.       Assessment/Plan       Assessment and plan:  Annual exam.  Check CMP, lipid panel, CBC

## 2023-04-18 ENCOUNTER — OFFICE VISIT (OUTPATIENT)
Dept: DERMATOLOGY | Facility: CLINIC | Age: 33
End: 2023-04-18
Payer: COMMERCIAL

## 2023-04-18 DIAGNOSIS — L24.3 IRRITANT CONTACT DERMATITIS DUE TO COSMETICS: ICD-10-CM

## 2023-04-18 DIAGNOSIS — L81.0 POSTINFLAMMATORY HYPERPIGMENTATION: Primary | ICD-10-CM

## 2023-04-18 PROCEDURE — 1159F MED LIST DOCD IN RCRD: CPT | Mod: CPTII,95,, | Performed by: DERMATOLOGY

## 2023-04-18 PROCEDURE — 1160F RVW MEDS BY RX/DR IN RCRD: CPT | Mod: CPTII,95,, | Performed by: DERMATOLOGY

## 2023-04-18 PROCEDURE — 1159F PR MEDICATION LIST DOCUMENTED IN MEDICAL RECORD: ICD-10-PCS | Mod: CPTII,95,, | Performed by: DERMATOLOGY

## 2023-04-18 PROCEDURE — 99204 PR OFFICE/OUTPT VISIT, NEW, LEVL IV, 45-59 MIN: ICD-10-PCS | Mod: 95,,, | Performed by: DERMATOLOGY

## 2023-04-18 PROCEDURE — 99204 OFFICE O/P NEW MOD 45 MIN: CPT | Mod: 95,,, | Performed by: DERMATOLOGY

## 2023-04-18 PROCEDURE — 1160F PR REVIEW ALL MEDS BY PRESCRIBER/CLIN PHARMACIST DOCUMENTED: ICD-10-PCS | Mod: CPTII,95,, | Performed by: DERMATOLOGY

## 2023-04-18 NOTE — PATIENT INSTRUCTIONS
Your prescription has been sent to the compounding pharmacy [facility].  They are located in [place] at [address] just before the [address].  If you have any questions, please call the pharmacy at [phone].  Website: [email or web address]       Examples of tinted SPF:  La Roche-Posay Anthelios Tinted SPF 50  IT Cosmetics CC+ Cream with SPF 50+  HAI Super Serum Skin Tint SPF 40  Trinity Geovanna Tinted Moisturizer Natural Skin Perfector SPF 30  Tarte BB Tinted Treatment 12 Hour Primer SPF 30  Colorescience Sunforgettable Total Protection Face Shield (liquid SPF 50)    Colorescience Sunforgettable Total Protection Brush-On Shield (powder SPF 50)  Avene Mineral Tinted Compact SPF 50  Supergoop CC Screen 100% Mineral CC Cream SPF 50

## 2023-04-18 NOTE — PROGRESS NOTES
The patient location is: home  The chief complaint leading to consultation is: discoloration  Visit type: virtual visit with synchronous audio and video  Total time spent with patient: 13 minutes  Each patient to whom I provide medical services by telemedicine is:  (1) informed of the relationship between the physician and patient and the respective role of any other health care provider with respect to management of the patient; and (2) notified that he or she may decline to receive medical services by telemedicine and may withdraw from such care at any time.    Patient Information  Name: Joe Stephens  : 1990  MRN: [id number]     Referring Physician:  No ref. provider found   Primary Care Physician:  Quin Nunez MD   Date of Visit: 2023      Subjective:     History of Present lllness:    Joe Stephens is a 32 y.o. male who presents with a chief complaint of spot.  Location: face  Duration: years - since he was a teenager  Signs/Symptoms: started as a dark spot from a pimple, was recently made worse by OTC dark spot tx. No itch or pain.  Relieving factors/Prior treatments: no relief from OTC moisturizers    Clinical documentation obtained by nursing staff reviewed.    Review of Systems    Objective:   Physical Exam   Constitutional: He appears well-developed and well-nourished. No distress.   Neurological: He is alert and oriented to person, place, and time. He is not disoriented.   Psychiatric: He has a normal mood and affect.   Skin:   Areas Examined (abnormalities noted in diagram):   Head / Face Inspection Performed     Diagram Legend     Erythematous scaling macule/papule c/w actinic keratosis       Vascular papule c/w angioma      Pigmented verrucoid papule/plaque c/w seborrheic keratosis      Yellow umbilicated papule c/w sebaceous hyperplasia      Irregularly shaped tan macule c/w lentigo     1-2 mm smooth white papules consistent with Milia      Movable subcutaneous cyst with punctum  c/w epidermal inclusion cyst      Subcutaneous movable cyst c/w pilar cyst      Firm pink to brown papule c/w dermatofibroma      Pedunculated fleshy papule(s) c/w skin tag(s)      Evenly pigmented macule c/w junctional nevus     Mildly variegated pigmented, slightly irregular-bordered macule c/w mildly atypical nevus      Flesh colored to evenly pigmented papule c/w intradermal nevus       Pink pearly papule/plaque c/w basal cell carcinoma      Erythematous hyperkeratotic cursted plaque c/w SCC      Surgical scar with no sign of skin cancer recurrence      Open and closed comedones      Inflammatory papules and pustules      Verrucoid papule consistent consistent with wart     Erythematous eczematous patches and plaques     Dystrophic onycholytic nail with subungual debris c/w onychomycosis     Umbilicated papule    Erythematous-base heme-crusted tan verrucoid plaque consistent with inflamed seborrheic keratosis     Erythematous Silvery Scaling Plaque c/w Psoriasis     See annotation              [] Data reviewed  [] Prior external notes reviewed  [] Independent review of test  [] Management discussed with another provider  [] Independent historian    Assessment / Plan:        Postinflammatory hyperpigmentation  Irritant contact dermatitis due to cosmetics  - chronic problem with exacerbation/progression  This is a normal discoloration of the skin after an inflammatory process has resolved. It may take months to years to fade on its own.  -     hydroquinone 8 % Emul; Compound hydroquinone 8% / tretinoin 0.025% / kojic acid 1% / niacinamide 4% / fluocinolone 0.025% cream. Apply a tiny amount to face qhs. Do not use for longer than 16 weeks in a row.  Dispense: 30 g; Refill: 0  Do not use the medication for longer than 16 weeks in a row. Prolonged use of hydroquinone has been associated with the development of exogenous ochronosis (a persistent blue-black pigmentation/staining of the skin).  In most cases, lightening  of skin should be seen after four weeks of treatment. Sometimes it may take longer to see any change, but if no lightening effect is seen after three months of treatment, you should stop using hydroquinone.  Discontinue med if any signs of worsening irritation.  Recommend using a broad-spectrum, water-resistant sunscreen with SPF of 30 or higher--reapply every 2 hours. Seek shade and wear sun-protective clothing/hat.    Follow up in about 3 months (around 7/18/2023).      Dorothy Root MD, FAAD  [facility]

## 2024-06-10 ENCOUNTER — PATIENT MESSAGE (OUTPATIENT)
Dept: INTERNAL MEDICINE | Facility: CLINIC | Age: 34
End: 2024-06-10
Payer: COMMERCIAL

## 2024-07-02 ENCOUNTER — PATIENT MESSAGE (OUTPATIENT)
Dept: UROLOGY | Facility: CLINIC | Age: 34
End: 2024-07-02
Payer: COMMERCIAL

## 2024-11-26 ENCOUNTER — IMMUNIZATION (OUTPATIENT)
Dept: INTERNAL MEDICINE | Facility: CLINIC | Age: 34
End: 2024-11-26
Payer: COMMERCIAL

## 2024-11-26 DIAGNOSIS — Z23 NEED FOR VACCINATION: Primary | ICD-10-CM

## 2024-11-26 PROCEDURE — 90656 IIV3 VACC NO PRSV 0.5 ML IM: CPT | Mod: S$GLB,,, | Performed by: INTERNAL MEDICINE

## 2024-11-26 PROCEDURE — 90471 IMMUNIZATION ADMIN: CPT | Mod: S$GLB,,, | Performed by: INTERNAL MEDICINE

## 2025-03-31 ENCOUNTER — TELEPHONE (OUTPATIENT)
Dept: DERMATOLOGY | Facility: CLINIC | Age: 35
End: 2025-03-31
Payer: COMMERCIAL

## 2025-03-31 NOTE — TELEPHONE ENCOUNTER
Return call made to patient regarding virtual appointment this morning however no answer, left vm to return my call.    ----- Message from Tia sent at 3/31/2025  9:51 AM CDT -----  Who Called: PtWhat is the request in detail: Requesting call back to discuss waiting on provider in virtual for over an hour. Please adviseCan the clinic reply by [facility]? [facility] Call Back Number: [phone] Information:

## 2025-04-03 ENCOUNTER — OFFICE VISIT (OUTPATIENT)
Dept: DERMATOLOGY | Facility: CLINIC | Age: 35
End: 2025-04-03
Payer: COMMERCIAL

## 2025-04-03 DIAGNOSIS — L81.0 POSTINFLAMMATORY HYPERPIGMENTATION: Primary | ICD-10-CM

## 2025-04-03 NOTE — PROGRESS NOTES
Patient Information  Name: Joe Stephens  : 1990  MRN: [id number]     Referring Physician:  Dr. Turpin ref. provider found   Primary Care Physician:  Quin Basurto MD   Date of Visit: 2025      Subjective:       Joe Stephens is a 34 y.o. male who presents for skin lesion    HPI  Patient with hx of skin discoloration, here for follow up.  Previously used compounded hydroquinone 8% with improvement however he misplaced the medication during the moving process.  He would like to get back on the medication.      Patient was last seen in Dermatology: Visit date not found.    Prior notes by myself reviewed.   Clinical documentation obtained by nursing staff reviewed.    Review of Systems   Skin:  Negative for itching and rash.        Objective:    Physical Exam   Constitutional: He appears well-developed and well-nourished. No distress.   Neurological: He is alert and oriented to person, place, and time. He is not disoriented.   Psychiatric: He has a normal mood and affect.   Skin:   Areas Examined (abnormalities noted in diagram):   Head / Face Inspection Performed              Diagram Legend     Erythematous scaling macule/papule c/w actinic keratosis       Vascular papule c/w angioma      Pigmented verrucoid papule/plaque c/w seborrheic keratosis      Yellow umbilicated papule c/w sebaceous hyperplasia      Irregularly shaped tan macule c/w lentigo     1-2 mm smooth white papules consistent with Milia      Movable subcutaneous cyst with punctum c/w epidermal inclusion cyst      Subcutaneous movable cyst c/w pilar cyst      Firm pink to brown papule c/w dermatofibroma      Pedunculated fleshy papule(s) c/w skin tag(s)      Evenly pigmented macule c/w junctional nevus     Mildly variegated pigmented, slightly irregular-bordered macule c/w mildly atypical nevus      Flesh colored to evenly pigmented papule c/w intradermal nevus       Pink pearly papule/plaque c/w basal cell carcinoma       Erythematous hyperkeratotic cursted plaque c/w SCC      Surgical scar with no sign of skin cancer recurrence      Open and closed comedones      Inflammatory papules and pustules      Verrucoid papule consistent consistent with wart     Erythematous eczematous patches and plaques     Dystrophic onycholytic nail with subungual debris c/w onychomycosis     Umbilicated papule    Erythematous-base heme-crusted tan verrucoid plaque consistent with inflamed seborrheic keratosis     Erythematous Silvery Scaling Plaque c/w Psoriasis     See annotation          [] Data reviewed    [] Prior external notes reviewed    [] Independent review of test    [] Management discussed with another provider    [] Independent historian    Assessment / Plan:        Postinflammatory hyperpigmentation  - Rx HQ12% sent via [employer]           The patient location is: [place]  The chief complaint leading to consultation is: skin discoloration    Visit type: audiovisual    Face to Face time with patient: 5 minutes  6 minutes of total time spent on the encounter, which includes face to face time and non-face to face time preparing to see the patient (eg, review of tests), Obtaining and/or reviewing separately obtained history, Documenting clinical information in the electronic or other health record, Independently interpreting results (not separately reported) and communicating results to the patient/family/caregiver, or Care coordination (not separately reported).         Each patient to whom he or she provides medical services by telemedicine is:  (1) informed of the relationship between the physician and patient and the respective role of any other health care provider with respect to management of the patient; and (2) notified that he or she may decline to receive medical services by telemedicine and may withdraw from such care at any time.          LOS NUMBER AND COMPLEXITY OF PROBLEMS    COMPLEXITY OF DATA RISK TOTAL TIME (m)   [id number]  [] 1 self-limited or minor problem [x] Minimal to none [] No treatment recommended or patient to monitor. Reassurance.  15-29  10-19   15635  [id number] Low  [] 2 or more self limited or minor problems  [] 1 stable chronic illness  [] 1 acute, uncomplicated illness or injury Limited (2)  [] Prior external notes from each unique source  [] Review result of each unique test  [] Order each unique test  OR [] Independent historian Low  []  OTC medications   []  Discussed/Decision for minor skin surgery (no risk factors) 30-44  20-29   13487  93152 Moderate  [x]  1 or more chronic unstable illness (not at goal or progression or exacerbation) or SE of treatment  []  2 or more stable chronic illnesses  []  1 acute illness with systemic symptoms  []  1 acute complicated injury  []  1 undiagnosed new problem with uncertain prognosis Moderate (1/3 below)  []  3 or more data items        *Now includes independent historian  []  Independent interpretation of a test  []  Discuss management/test with another provider Moderate  [x]  Prescription drug mgmt  []  Discussed/Decision for Minor surgery with risk factors  []  Mgmt limited by social determinates 45-59  30-39   98742  54672 High  []  1 or more chronic illness with severe exacerbation, progression or SE of treatment  []  1 acute or chronic illness/injury that poses a threat to life or bodily function Extensive (2/3 below)  []  3 or more data items        *Now includes independent historian.  []  Independent interpretation of a test  []  Discuss management/test with another provider High  []  Major surgery with risk discussed  []  Drug therapy requiring intensive monitoring for toxicity  []  Hospitalization  []  Decision for DNR 60-74  40-54
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
## 2025-06-10 ENCOUNTER — OFFICE VISIT (OUTPATIENT)
Dept: INTERNAL MEDICINE | Facility: CLINIC | Age: 35
End: 2025-06-10
Payer: COMMERCIAL

## 2025-06-10 ENCOUNTER — PATIENT MESSAGE (OUTPATIENT)
Dept: INTERNAL MEDICINE | Facility: CLINIC | Age: 35
End: 2025-06-10

## 2025-06-10 ENCOUNTER — LAB VISIT (OUTPATIENT)
Dept: LAB | Facility: HOSPITAL | Age: 35
End: 2025-06-10
Payer: COMMERCIAL

## 2025-06-10 VITALS
BODY MASS INDEX: 22.19 KG/M2 | HEIGHT: 72 IN | WEIGHT: 163.81 LBS | OXYGEN SATURATION: 99 % | HEART RATE: 53 BPM | DIASTOLIC BLOOD PRESSURE: 78 MMHG | SYSTOLIC BLOOD PRESSURE: 128 MMHG

## 2025-06-10 DIAGNOSIS — Z00.00 ANNUAL PHYSICAL EXAM: Primary | ICD-10-CM

## 2025-06-10 DIAGNOSIS — D64.9 ANEMIA, UNSPECIFIED TYPE: ICD-10-CM

## 2025-06-10 DIAGNOSIS — Z00.00 ANNUAL PHYSICAL EXAM: ICD-10-CM

## 2025-06-10 DIAGNOSIS — G47.00 INSOMNIA, UNSPECIFIED TYPE: ICD-10-CM

## 2025-06-10 LAB
ABSOLUTE EOSINOPHIL (OHS): 0.42 K/UL
ABSOLUTE MONOCYTE (OHS): 0.54 K/UL (ref 0.3–1)
ABSOLUTE NEUTROPHIL COUNT (OHS): 1.56 K/UL (ref 1.8–7.7)
ALBUMIN SERPL BCP-MCNC: 4.1 G/DL (ref 3.5–5.2)
ALP SERPL-CCNC: 36 UNIT/L (ref 40–150)
ALT SERPL W/O P-5'-P-CCNC: 20 UNIT/L (ref 10–44)
ANION GAP (OHS): 7 MMOL/L (ref 8–16)
AST SERPL-CCNC: 24 UNIT/L (ref 11–45)
BASOPHILS # BLD AUTO: 0.04 K/UL
BASOPHILS NFR BLD AUTO: 0.7 %
BILIRUB SERPL-MCNC: 0.5 MG/DL (ref 0.1–1)
BUN SERPL-MCNC: 21 MG/DL (ref 6–20)
CALCIUM SERPL-MCNC: 9.2 MG/DL (ref 8.7–10.5)
CHLORIDE SERPL-SCNC: 108 MMOL/L (ref 95–110)
CHOLEST SERPL-MCNC: 157 MG/DL (ref 120–199)
CHOLEST/HDLC SERPL: 3 {RATIO} (ref 2–5)
CO2 SERPL-SCNC: 23 MMOL/L (ref 23–29)
CREAT SERPL-MCNC: 1.5 MG/DL (ref 0.5–1.4)
EAG (OHS): 120 MG/DL (ref 68–131)
ERYTHROCYTE [DISTWIDTH] IN BLOOD BY AUTOMATED COUNT: 14.2 % (ref 11.5–14.5)
FERRITIN SERPL-MCNC: 79 NG/ML (ref 20–300)
GFR SERPLBLD CREATININE-BSD FMLA CKD-EPI: >60 ML/MIN/1.73/M2
GLUCOSE SERPL-MCNC: 88 MG/DL (ref 70–110)
HBA1C MFR BLD: 5.8 % (ref 4–5.6)
HCT VFR BLD AUTO: 40.8 % (ref 40–54)
HDLC SERPL-MCNC: 52 MG/DL (ref 40–75)
HDLC SERPL: 33.1 % (ref 20–50)
HGB BLD-MCNC: 12.2 GM/DL (ref 14–18)
IMM GRANULOCYTES # BLD AUTO: 0.01 K/UL (ref 0–0.04)
IMM GRANULOCYTES NFR BLD AUTO: 0.2 % (ref 0–0.5)
IRON SATN MFR SERPL: 23 % (ref 20–50)
IRON SERPL-MCNC: 81 UG/DL (ref 45–160)
LDLC SERPL CALC-MCNC: 96 MG/DL (ref 63–159)
LYMPHOCYTES # BLD AUTO: 2.93 K/UL (ref 1–4.8)
MCH RBC QN AUTO: 24.6 PG (ref 27–31)
MCHC RBC AUTO-ENTMCNC: 29.9 G/DL (ref 32–36)
MCV RBC AUTO: 82 FL (ref 82–98)
NONHDLC SERPL-MCNC: 105 MG/DL
NUCLEATED RBC (/100WBC) (OHS): 0 /100 WBC
PLATELET # BLD AUTO: 210 K/UL (ref 150–450)
PMV BLD AUTO: 11 FL (ref 9.2–12.9)
POTASSIUM SERPL-SCNC: 4.3 MMOL/L (ref 3.5–5.1)
PROT SERPL-MCNC: 7.1 GM/DL (ref 6–8.4)
RBC # BLD AUTO: 4.95 M/UL (ref 4.6–6.2)
RELATIVE EOSINOPHIL (OHS): 7.6 %
RELATIVE LYMPHOCYTE (OHS): 53.3 % (ref 18–48)
RELATIVE MONOCYTE (OHS): 9.8 % (ref 4–15)
RELATIVE NEUTROPHIL (OHS): 28.4 % (ref 38–73)
SODIUM SERPL-SCNC: 138 MMOL/L (ref 136–145)
TIBC SERPL-MCNC: 360 UG/DL (ref 250–450)
TRANSFERRIN SERPL-MCNC: 243 MG/DL (ref 200–375)
TRIGL SERPL-MCNC: 45 MG/DL (ref 30–150)
TSH SERPL-ACNC: 1.46 UIU/ML (ref 0.4–4)
WBC # BLD AUTO: 5.5 K/UL (ref 3.9–12.7)

## 2025-06-10 PROCEDURE — 84466 ASSAY OF TRANSFERRIN: CPT

## 2025-06-10 PROCEDURE — 80053 COMPREHEN METABOLIC PANEL: CPT

## 2025-06-10 PROCEDURE — 80061 LIPID PANEL: CPT

## 2025-06-10 PROCEDURE — 83036 HEMOGLOBIN GLYCOSYLATED A1C: CPT

## 2025-06-10 PROCEDURE — 99999 PR PBB SHADOW E&M-EST. PATIENT-LVL IV: CPT | Mod: PBBFAC,,, | Performed by: PHYSICIAN ASSISTANT

## 2025-06-10 PROCEDURE — 99395 PREV VISIT EST AGE 18-39: CPT | Mod: S$GLB,,, | Performed by: PHYSICIAN ASSISTANT

## 2025-06-10 PROCEDURE — 85025 COMPLETE CBC W/AUTO DIFF WBC: CPT

## 2025-06-10 PROCEDURE — 3008F BODY MASS INDEX DOCD: CPT | Mod: CPTII,S$GLB,, | Performed by: PHYSICIAN ASSISTANT

## 2025-06-10 PROCEDURE — 3078F DIAST BP <80 MM HG: CPT | Mod: CPTII,S$GLB,, | Performed by: PHYSICIAN ASSISTANT

## 2025-06-10 PROCEDURE — 84443 ASSAY THYROID STIM HORMONE: CPT

## 2025-06-10 PROCEDURE — 36415 COLL VENOUS BLD VENIPUNCTURE: CPT

## 2025-06-10 PROCEDURE — 1159F MED LIST DOCD IN RCRD: CPT | Mod: CPTII,S$GLB,, | Performed by: PHYSICIAN ASSISTANT

## 2025-06-10 PROCEDURE — 82728 ASSAY OF FERRITIN: CPT

## 2025-06-10 PROCEDURE — 3074F SYST BP LT 130 MM HG: CPT | Mod: CPTII,S$GLB,, | Performed by: PHYSICIAN ASSISTANT

## 2025-06-10 RX ORDER — DOXEPIN HYDROCHLORIDE 10 MG/1
10 CAPSULE ORAL NIGHTLY
Qty: 30 CAPSULE | Refills: 11 | Status: SHIPPED | OUTPATIENT
Start: 2025-06-10 | End: 2026-06-10

## 2025-06-10 RX ORDER — DOXEPIN 6 MG/1
6 TABLET, FILM COATED ORAL NIGHTLY
Qty: 30 TABLET | Refills: 11 | Status: SHIPPED | OUTPATIENT
Start: 2025-06-10 | End: 2025-06-10 | Stop reason: ALTCHOICE

## 2025-06-10 NOTE — PROGRESS NOTES
Internal Medicine Annual Exam       CHIEF COMPLAINT     The patient, Joe Stephens, who is a 34 y.o. male presents for an annual exam.    HPI     PCP is Quin Nnuez MD, patient is new to me.     Patient presents for annual exam and with new complaint of insomnia.    INSOMNIA:    Has been present for the past few months.  Patient does not snore.  He has trouble staying asleep not falling asleep.  No nocturia.  Good sleep hygiene.  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    HM:  He is eligible for COVID vaccine, hepatitis-C screening, HIV screening, - politely declines all  Reports that tetanus vaccine is up-to-date does not documented in epic  He is due for annual labs      Past Medical History:  No past medical history on file.    Past Surgical History:   Procedure Laterality Date    COLONOSCOPY  2010    EXCISIONAL HEMORRHOIDECTOMY N/A 08/21/2020    Procedure: HEMORRHOIDECTOMY: internal and external x1 column;  Surgeon: Joon Baker MD;  Location: [facility] OR [address] [place] [address];  Service: Colon and Rectal;  Laterality: N/A;    EXCISIONAL HEMORRHOIDECTOMY N/A 08/24/2020    Procedure: HEMORRHOIDECTOMY, Internal and external x1 column;  Surgeon: Joon Baker MD;  [facility];  Service: Colon and Rectal;  Laterality: N/A;        No family history on file.     Social History[1]     Tobacco Use History[2]     Allergies as of 06/10/2025 - Reviewed 06/10/2025   Allergen Reaction Noted    Sulfa (sulfonamide antibiotics)  06/12/2020          Home Medications:  Prior to Admission medications    Medication Sig Start Date End Date Taking? Authorizing Provider   hydroquinone 8 % Emul Compound hydroquinone 8% / tretinoin 0.025% / kojic acid 1% / niacinamide 4% / fluocinolone 0.025% cream. Apply a tiny amount to face qhs. Do not use for longer than 16 weeks in a row. 4/18/23  Yes Dorothy Root MD       Review of Systems:  Review of Systems   Constitutional:  Negative for chills and  fever.   HENT:  Negative for sore throat and trouble swallowing.    Eyes:  Negative for visual disturbance.   Respiratory:  Negative for cough and shortness of breath.    Cardiovascular:  Negative for chest pain.   Gastrointestinal:  Negative for abdominal pain, constipation, diarrhea, nausea and vomiting.   Genitourinary:  Negative for dysuria and flank pain.   Musculoskeletal:  Negative for back pain, neck pain and neck stiffness.   Skin:  Negative for rash.   Neurological:  Negative for dizziness, syncope, weakness and headaches.   Psychiatric/Behavioral:  Positive for sleep disturbance. Negative for confusion.        Health Maintainence:   Immunizations:  Health Maintenance         Date Due Completion Date    Hepatitis C Screening Never done ---    HIV Screening Never done ---    TETANUS VACCINE Never done ---    COVID-19 Vaccine (3 - 2024-25 season) 09/01/2024 1/7/2021    RSV Vaccine (Age 60+ and Pregnant patients) (1 - 1-dose 75+ series) 06/13/2065 ---             PHYSICAL EXAM     /78   Pulse (!) 53   Ht 6' (1.829 m)   Wt 74.3 kg (163 lb 12.8 oz)   SpO2 99%   BMI 22.22 kg/m²  Body mass index is 22.22 kg/m².    Physical Exam  Vitals and nursing note reviewed.   Constitutional:       Appearance: Normal appearance.      Comments: Healthy-appearing male in no acute distress or apparent pain.  He makes good eye contact, speaks in clear full sentences and ambulates with ease.   HENT:      Head: Normocephalic and atraumatic.      Nose: Nose normal.      Mouth/Throat:      Pharynx: Oropharynx is clear.   Eyes:      Conjunctiva/sclera: Conjunctivae normal.   Cardiovascular:      Rate and Rhythm: Normal rate and regular rhythm.      Pulses: Normal pulses.   Pulmonary:      Effort: No respiratory distress.   Abdominal:      Tenderness: There is no abdominal tenderness.   Musculoskeletal:         General: Normal range of motion.      Cervical back: No rigidity.   Skin:     General: Skin is warm and dry.       "Capillary Refill: Capillary refill takes less than 2 seconds.      Findings: No rash.   Neurological:      General: No focal deficit present.      Mental Status: He is alert.      Gait: Gait normal.   Psychiatric:         Mood and Affect: Mood normal.         LABS     No results found for: "LABA1C", "HGBA1C"  CMP  Sodium   Date Value Ref Range Status   03/25/2023 139 136 - 145 mmol/L Final     Potassium   Date Value Ref Range Status   03/25/2023 4.6 3.5 - 5.1 mmol/L Final     Chloride   Date Value Ref Range Status   03/25/2023 106 95 - 110 mmol/L Final     CO2   Date Value Ref Range Status   03/25/2023 24 23 - 29 mmol/L Final     Glucose   Date Value Ref Range Status   03/25/2023 98 70 - 110 mg/dL Final     BUN   Date Value Ref Range Status   03/25/2023 22 (H) 6 - 20 mg/dL Final     Creatinine   Date Value Ref Range Status   03/25/2023 1.4 0.5 - 1.4 mg/dL Final     Calcium   Date Value Ref Range Status   03/25/2023 10.0 8.7 - 10.5 mg/dL Final     Total Protein   Date Value Ref Range Status   03/25/2023 7.5 6.0 - 8.4 g/dL Final     Albumin   Date Value Ref Range Status   03/25/2023 4.1 3.5 - 5.2 g/dL Final     Total Bilirubin   Date Value Ref Range Status   03/25/2023 0.6 0.1 - 1.0 mg/dL Final     Comment:     For infants and newborns, interpretation of results should be based  on gestational age, weight and in agreement with clinical  observations.    Premature Infant recommended reference ranges:  Up to 24 hours.............<8.0 mg/dL  Up to 48 hours............<12.0 mg/dL  3-5 days..................<15.0 mg/dL  6-29 days.................<15.0 mg/dL       Alkaline Phosphatase   Date Value Ref Range Status   03/25/2023 42 (L) 55 - 135 U/L Final     AST   Date Value Ref Range Status   03/25/2023 27 10 - 40 U/L Final     ALT   Date Value Ref Range Status   03/25/2023 29 10 - 44 U/L Final     Anion Gap   Date Value Ref Range Status   03/25/2023 9 8 - 16 mmol/L Final     eGFR if    Date Value Ref Range " "Status   08/21/2020 >60.0 >60 mL/min/1.73 m^2 Final     eGFR if non    Date Value Ref Range Status   08/21/2020 >60.0 >60 mL/min/1.73 m^2 Final     Comment:     Calculation used to obtain the estimated glomerular filtration  rate (eGFR) is the CKD-EPI equation.        Lab Results   Component Value Date    WBC 5.68 03/25/2023    HGB 12.7 (L) 03/25/2023    HCT 42.1 03/25/2023    MCV 84 03/25/2023     03/25/2023     Lab Results   Component Value Date    CHOL 139 03/25/2023     Lab Results   Component Value Date    HDL 49 03/25/2023     Lab Results   Component Value Date    LDLCALC 79.4 03/25/2023     Lab Results   Component Value Date    TRIG 53 03/25/2023     Lab Results   Component Value Date    CHOLHDL 35.3 03/25/2023     No results found for: "TSH", "R8SNAES", "I1BVIFF", "THYROIDAB"    ASSESSMENT/PLAN     Joe Stephens is a 34 y.o. male    Joe "Geremias" was seen today for annual exam.  Will try doxepin to help with sleep.  Sleep medicine referral entered in the event that doxepin fails.  Will get routine labs including iron and ferritin levels.  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  He will let us know if he needs anything further from primary care standpoint.    Diagnoses and all orders for this visit:    Annual physical exam  -     CBC Auto Differential; Future  -     Comprehensive Metabolic Panel; Future  -     Hemoglobin A1C; Future  -     Lipid Panel; Future  -     TSH; Future    Insomnia, unspecified type  -     Ambulatory referral/consult to Sleep Disorders; Future  -     doxepin (SILENOR) 6 mg Tab; Take 6 mg by mouth every evening.    Anemia, unspecified type  -     Iron and TIBC; Future  -     Ferritin; Future      Sisi Forrest PA-C  Department of Internal Medicine - [facility]   9:02 AM          [1]   Social History  Socioeconomic History    Marital status:  "   Tobacco Use    Smoking status: Never    Smokeless tobacco: Never   Substance and Sexual Activity    Alcohol use: Yes     Comment: Social    Drug use: Never    Sexual activity: Yes     Partners: Female     Birth control/protection: Condom     Social Drivers of Health     Financial Resource Strain: Low Risk  (4/2/2025)    Overall Financial Resource Strain (CARDIA)     Difficulty of Paying Living Expenses: Not hard at all   Food Insecurity: No Food Insecurity (4/2/2025)    Hunger Vital Sign     Worried About Running Out of Food in the Last Year: Never true     Ran Out of Food in the Last Year: Never true   Transportation Needs: No Transportation Needs (4/2/2025)    PRAPARE - Transportation     Lack of Transportation (Medical): No     Lack of Transportation (Non-Medical): No   Physical Activity: Sufficiently Active (4/2/2025)    Exercise Vital Sign     Days of Exercise per Week: 4 days     Minutes of Exercise per Session: 50 min   Stress: Stress Concern Present (4/2/2025)    [employer] - Occupational Stress Questionnaire     Feeling of Stress : Very much   Housing Stability: Low Risk  (4/2/2025)    Housing Stability Vital Sign     Unable to Pay for Housing in the Last Year: No     Number of Times Moved in the Last Year: 0     Homeless in the Last Year: No   [2]   Social History  Tobacco Use   Smoking Status Never   Smokeless Tobacco Never

## 2025-06-11 PROBLEM — G47.00 INSOMNIA: Status: ACTIVE | Noted: 2025-06-11

## 2025-06-11 NOTE — PROGRESS NOTES
The patient location is: [facility]  The chief complaint leading to consultation is:   Chief Complaint   Patient presents with    Insomnia        Visit type: audiovisual    Face to Face time with patient: 15 mnin  45 minutes of total time spent on the encounter, which includes face to face time and non-face to face time preparing to see the patient (eg, review of tests), Obtaining and/or reviewing separately obtained history, Documenting clinical information in the electronic or other health record, Independently interpreting results (not separately reported) and communicating results to the patient/family/caregiver, or Care coordination (not separately reported).         Each patient to whom he or she provides medical services by telemedicine is:  (1) informed of the relationship between the physician and patient and the respective role of any other health care provider with respect to management of the patient; and (2) notified that he or she may decline to receive medical services by telemedicine and may withdraw from such care at any time.    Notes:                                               Pulmonary Outpatient  Visit     Subjective:       Patient ID: Joe Stephens is a 34 y.o. male.    Tobacco Use History[1]         Chief Complaint: Insomnia          Joe Stephens is 34 y.o.  Referred by Sisi Forrest, PA-C  [address] [place] [address]   Concern for insomnia   Sleep issues off and on  Trouble sleep maintance  Wake 1-3 hrs earlier  Difficulty falling asleep  Fatigue works in day   4-6 months  Used melatonin 10 mg  Medications doxepin   No snoring  No PSG  Bedtime : 9-10 pm  Wake-up  : alarm for 5 am,   Naturally no particular chronotype              Review of Systems   Constitutional:  Positive for fatigue.   Psychiatric/Behavioral:  Positive for sleep disturbance.        Encounter Medications[2]        Pertinent Work Up:      Pulmonary Interventions:      Smoking  hx:    Environmental/Occupational hx:        Interval Hx:         The following portions of the patient's history were reviewed and updated as appropriate: He  has no past medical history on file.  He does not have any pertinent problems on file.  He  has a past surgical history that includes Colonoscopy (2010); Excisional hemorrhoidectomy (N/A, 08/21/2020); and Excisional hemorrhoidectomy (N/A, 08/24/2020).  His family history is not on file.  He  reports that he has never smoked. He has never used smokeless tobacco. He reports current alcohol use. He reports that he does not use drugs.  He has a current medication list which includes the following prescription(s): doxepin, hydroquinone, and lemborexant, and the following Facility-Administered Medications: 0.9% nacl and mupirocin.  Medications Ordered Prior to Encounter[3]  He is allergic to sulfa (sulfonamide antibiotics)..      BP Readings from Last 3 Encounters:   06/10/25 128/78   03/25/23 128/80   11/05/20 118/78     Snoring / Sleep:     [place] Questionnaire (validated DAVID screening questionnaire)    No -- Snoring/apnea    YES -- Fatigue    Body mass index is 22.24 kg/m².  (>25 is overweight, >30 is obese)    Blood Pressure = normal blood pressure  (PreHTN 120-139/80-89, Stg1 140-159/90-99, Stg2 >160/>100)  [place] = 1 of three DAVID categories are positive (high risk is 2-3 positive categories)     [place] Sleepiness Scale TOTAL =        6/12/2025   EPWORTH SLEEPINESS SCALE   Sitting and reading 1   Watching TV 2   Sitting, inactive in a public place (e.g. a theatre or a meeting) 1   As a passenger in a car for an hour without a break 2   Lying down to rest in the afternoon when circumstances permit 2   Sitting and talking to someone 0   Sitting quietly after a lunch without alcohol 1   In a car, while stopped for a few minutes in traffic 0   Total score 9      (validated sleepiness questionnaire with a higher score indicating greater sleepiness; range  0-24)  Failed to redirect to the Timeline version of the [employer] SmartLink.    STOP-Bang Questionnaire (validated DAVID screening questionnaire)  Negative unless checked off.  [] Snoring    [x]  Tired/Fatigued/Sleepy  [] Obstruction (apneas/choking)  [] Pressure (HTN)  [] BMI >35  [] Age >50  [] Neck >40 cm  [] Gender male   STOP-Bang = 1 (low risk 0-2,high risk 3-8)    Neck circumf     MMRC Dyspnea Scale (4 is worst)     [] MMRC 0: Dyspneic on strenuous excercise (0 points)    [] MMRC 1: Dyspneic on walking a slight hill (0 points)    [] MMRC 2: Dyspneic on walking level ground; must stop occasionally due to breathlessness (1 point)    [] MMRC 3: Must stop for breathlessness after walking 100 yards or after a few minutes (2 points)    [] MMRC 4: Cannot leave house; breathless on dressing/undressing (3 points)                          No data to display                 Please rate the CURRENT (i.e. LAST 2 WEEKS) SEVERITY of your insomnia problem(s).  1. Difficulty falling asleep: None  2. Difficulty staying asleep: Severe  3. Problem waking up too early: Severe  4. How SATISFIED/DISSATISFIED are you with your CURRENT sleep pattern?: Dissatisfied  5. How NOTICEABLE to others do you think your sleep problem is in terms of impairing the quality of your life?: Somewhat  6.  How WORRIED/DISTRESSED are you about your current sleep problem?: Much  7. To what extent do you consider your sleep problem to INTERFERE with your daily functioning (e.g. daytime fatigue, mood, ability to function at work/daily chores, concentration, memory, mood, etc.) CURRENTLY?: Much  Total Score: 17  Interpretation: Clinical insomnia (moderate severity)        Objective:     Vital Signs (Most Recent)  Vital Signs  Pulse: (!) 55  SpO2: 98 %  Height and Weight  Height: 6' (182.9 cm)  Weight: 74.4 kg (164 lb)  BSA (Calculated - sq m): 1.94 sq meters  BMI (Calculated): 22.2  Weight in (lb) to have BMI = 25: 183.9]  Wt Readings from Last 2 Encounters:  "  06/12/25 74.4 kg (164 lb)   06/10/25 74.3 kg (163 lb 12.8 oz)       Physical Exam  Vitals and nursing note reviewed.   Eyes:      Pupils: Pupils are equal, round, and reactive to light.   Neurological:      Mental Status: He is alert and oriented to person, place, and time.          Laboratory  Lab Results   Component Value Date    WBC 5.50 06/10/2025    RBC 4.95 06/10/2025    HGB 12.2 (L) 06/10/2025    HCT 40.8 06/10/2025    MCV 82 06/10/2025    MCH 24.6 (L) 06/10/2025    MCHC 29.9 (L) 06/10/2025    RDW 14.2 06/10/2025     06/10/2025    MPV 11.0 06/10/2025    GRAN 1.7 (L) 03/25/2023    GRAN 29.2 (L) 03/25/2023    LYMPH 53.3 (H) 06/10/2025    LYMPH 2.93 06/10/2025    MONO 9.8 06/10/2025    MONO 0.54 06/10/2025    EOS 7.6 06/10/2025    EOS 0.42 06/10/2025    BASO 0.04 03/25/2023    EOSINOPHIL 7.9 03/25/2023    BASOPHIL 0.7 06/10/2025    BASOPHIL 0.04 06/10/2025       BMP  Lab Results   Component Value Date     06/10/2025    K 4.3 06/10/2025     06/10/2025    CO2 23 06/10/2025    BUN 21 (H) 06/10/2025    CREATININE 1.5 (H) 06/10/2025    CALCIUM 9.2 06/10/2025    ANIONGAP 7 (L) 06/10/2025    ESTGFRAFRICA >60.0 08/21/2020    EGFRNONAA >60.0 08/21/2020    AST 24 06/10/2025    ALT 20 06/10/2025    PROT 7.1 06/10/2025          No results found for: "IGE"     No results found for: "ASPERGILLUS"  No results found for: "AFUMIGATUSCL"     No results found for: "ACE"     Diagnostic Results:  I have personally reviewed today the following studies:    No image results found.           Assessment/Plan:          1. Insomnia, unspecified type  Assessment & Plan:  Please rate the CURRENT (i.e. LAST 2 WEEKS) SEVERITY of your insomnia problem(s).  1. Difficulty falling asleep: None  2. Difficulty staying asleep: Severe  3. Problem waking up too early: Severe  4. How SATISFIED/DISSATISFIED are you with your CURRENT sleep pattern?: Dissatisfied  5. How NOTICEABLE to others do you think your sleep problem is in terms " of impairing the quality of your life?: Somewhat  6.  How WORRIED/DISTRESSED are you about your current sleep problem?: Much  7. To what extent do you consider your sleep problem to INTERFERE with your daily functioning (e.g. daytime fatigue, mood, ability to function at work/daily chores, concentration, memory, mood, etc.) CURRENTLY?: Much  Total Score: 17  Interpretation: Clinical insomnia (moderate severity)     Trouble with sleep maintainace  Tried melatonin, recently given Doxepin: Not tried yet  Wants LEMBOREXANT    Discussed pathway for insomnia care  Sleep diary  Behavioral interventions  had better outcome over pharmacotherapy  Where medications added step wise approach : doxepin has bee ordered  Chronic use of medication: risk, habituation  Potential side effects especially emergence of complex sleep activity  Baseline PSG recommended    Orders:  -     Ambulatory referral/consult to Sleep Disorders  -     lemborexant 5 mg Tab; Take 5 mg by mouth every evening.  Dispense: 30 tablet; Refill: 0  -     Polysomnogram (CPAP will be added if patient meets diagnostic criteria.); Future  -     Ambulatory consult to Behavioral Health; Future; Expected date: 06/19/2025           Follow up in about 8 weeks (around 8/7/2025), or SDI, Sleep diary, PSG, ref behavioural therapy.    This note was prepared using voice recognition system and is likely to have sound alike errors that may have been overlooked even after proof reading.  Please call me with any questions    Discussed diagnosis, its evaluation, treatment and usual course. All questions answered.    The patient was given open opportunity to ask questions and/or express concerns about treatment plan.   All questions/concerns were discussed.       Two patient identifiers used prior to evaluation.     Thank you for the courtesy of participating in the care of this patient    Seymour Hendricks MD      Personal Diagnostic Review  []  CXR    []  ECHO    []   ONSAT    []  6MWD    []  LABS    []  CHEST CT    []  PET CT    []  Biopsy results          New          [1]   Social History  Tobacco Use   Smoking Status Never   Smokeless Tobacco Never   [2]   Outpatient Encounter Medications as of 6/12/2025   Medication Sig Dispense Refill    doxepin (SINEQUAN) 10 MG capsule Take 1 capsule (10 mg total) by mouth every evening. 30 capsule 11    hydroquinone 8 % Emul Compound hydroquinone 8% / tretinoin 0.025% / kojic acid 1% / niacinamide 4% / fluocinolone 0.025% cream. Apply a tiny amount to face qhs. Do not use for longer than 16 weeks in a row. 30 g 0    lemborexant 5 mg Tab Take 5 mg by mouth every evening. 30 tablet 0     Facility-Administered Encounter Medications as of 6/12/2025   Medication Dose Route Frequency Provider Last Rate Last Admin    0.9%  NaCl infusion   Intravenous Continuous Brittney Santos NP   Stopped at 08/24/20 1710    mupirocin 2 % ointment   Nasal On Call Procedure Brittney Santos NP   Given at 08/21/20 0947   [3]   Current Outpatient Medications on File Prior to Visit   Medication Sig Dispense Refill    doxepin (SINEQUAN) 10 MG capsule Take 1 capsule (10 mg total) by mouth every evening. 30 capsule 11    hydroquinone 8 % Emul Compound hydroquinone 8% / tretinoin 0.025% / kojic acid 1% / niacinamide 4% / fluocinolone 0.025% cream. Apply a tiny amount to face qhs. Do not use for longer than 16 weeks in a row. 30 g 0     Current Facility-Administered Medications on File Prior to Visit   Medication Dose Route Frequency Provider Last Rate Last Admin    0.9%  NaCl infusion   Intravenous Continuous Brittney Santos NP   Stopped at 08/24/20 1710    mupirocin 2 % ointment   Nasal On Call Procedure Brittney Santos NP   Given at 08/21/20 0942

## 2025-06-12 ENCOUNTER — OFFICE VISIT (OUTPATIENT)
Dept: SLEEP MEDICINE | Facility: CLINIC | Age: 35
End: 2025-06-12
Payer: COMMERCIAL

## 2025-06-12 VITALS — WEIGHT: 164 LBS | HEIGHT: 72 IN | BODY MASS INDEX: 22.21 KG/M2 | OXYGEN SATURATION: 98 % | HEART RATE: 55 BPM

## 2025-06-12 DIAGNOSIS — G47.00 INSOMNIA, UNSPECIFIED TYPE: Primary | ICD-10-CM

## 2025-06-12 PROCEDURE — 3044F HG A1C LEVEL LT 7.0%: CPT | Mod: CPTII,95,, | Performed by: INTERNAL MEDICINE

## 2025-06-12 PROCEDURE — 1160F RVW MEDS BY RX/DR IN RCRD: CPT | Mod: CPTII,95,, | Performed by: INTERNAL MEDICINE

## 2025-06-12 PROCEDURE — 98002 SYNCH AUDIO-VIDEO NEW MOD 45: CPT | Mod: 95,,, | Performed by: INTERNAL MEDICINE

## 2025-06-12 PROCEDURE — 1159F MED LIST DOCD IN RCRD: CPT | Mod: CPTII,95,, | Performed by: INTERNAL MEDICINE

## 2025-06-12 NOTE — ASSESSMENT & PLAN NOTE
Please rate the CURRENT (i.e. LAST 2 WEEKS) SEVERITY of your insomnia problem(s).  1. Difficulty falling asleep: None  2. Difficulty staying asleep: Severe  3. Problem waking up too early: Severe  4. How SATISFIED/DISSATISFIED are you with your CURRENT sleep pattern?: Dissatisfied  5. How NOTICEABLE to others do you think your sleep problem is in terms of impairing the quality of your life?: Somewhat  6.  How WORRIED/DISTRESSED are you about your current sleep problem?: Much  7. To what extent do you consider your sleep problem to INTERFERE with your daily functioning (e.g. daytime fatigue, mood, ability to function at work/daily chores, concentration, memory, mood, etc.) CURRENTLY?: Much  Total Score: 17  Interpretation: Clinical insomnia (moderate severity)     Trouble with sleep maintainace  Tried melatonin, recently given Doxepin: Not tried yet  Wants LEMBOREXANT    Discussed pathway for insomnia care  Sleep diary  Behavioral interventions  had better outcome over pharmacotherapy  Where medications added step wise approach : doxepin has bee ordered  Chronic use of medication: risk, habituation  Potential side effects especially emergence of complex sleep activity  Baseline PSG recommended

## (undated) DEVICE — NDL 22GA X1 1/2 REG BEVEL

## (undated) DEVICE — SEE MEDLINE ITEM 157148

## (undated) DEVICE — SEE MEDLINE ITEM 146417

## (undated) DEVICE — GAUZE SPONGE 4X4 12PLY

## (undated) DEVICE — TAPE SILK 3IN

## (undated) DEVICE — TRAY MINOR GEN SURG

## (undated) DEVICE — SPONGE SURGIFOAM 100 8.5X12X10

## (undated) DEVICE — COVER LIGHT HANDLE 80/CA

## (undated) DEVICE — ELECTRODE REM PLYHSV RETURN 9

## (undated) DEVICE — SYR ONLY LUER LOCK 20CC

## (undated) DEVICE — GOWN SMART IMP BREATHABLE XXLG

## (undated) DEVICE — SEE MEDLINE ITEM 152622

## (undated) DEVICE — LUBRICANT SURGILUBE 2 OZ

## (undated) DEVICE — HEMOSTAT SURGICEL 2X14IN

## (undated) DEVICE — BRIEF MESH LARGE

## (undated) DEVICE — PANTIES FEMININE NAPKIN LG/XLG

## (undated) DEVICE — BOVIE SUCTION